# Patient Record
Sex: MALE | Race: BLACK OR AFRICAN AMERICAN | NOT HISPANIC OR LATINO | Employment: UNEMPLOYED | URBAN - METROPOLITAN AREA
[De-identification: names, ages, dates, MRNs, and addresses within clinical notes are randomized per-mention and may not be internally consistent; named-entity substitution may affect disease eponyms.]

---

## 2024-01-22 ENCOUNTER — CLINICAL SUPPORT (OUTPATIENT)
Dept: EMERGENCY MEDICINE | Facility: HOSPITAL | Age: 29
End: 2024-01-22
Payer: COMMERCIAL

## 2024-01-22 ENCOUNTER — HOSPITAL ENCOUNTER (EMERGENCY)
Facility: HOSPITAL | Age: 29
Discharge: HOME | End: 2024-01-22
Attending: EMERGENCY MEDICINE
Payer: COMMERCIAL

## 2024-01-22 VITALS
OXYGEN SATURATION: 96 % | TEMPERATURE: 98.4 F | RESPIRATION RATE: 18 BRPM | SYSTOLIC BLOOD PRESSURE: 147 MMHG | HEART RATE: 61 BPM | DIASTOLIC BLOOD PRESSURE: 94 MMHG

## 2024-01-22 DIAGNOSIS — R55 SYNCOPE, NEAR: Primary | ICD-10-CM

## 2024-01-22 LAB
ALBUMIN SERPL BCP-MCNC: 4.9 G/DL (ref 3.4–5)
ALP SERPL-CCNC: 41 U/L (ref 33–120)
ALT SERPL W P-5'-P-CCNC: 16 U/L (ref 10–52)
ANION GAP BLDV CALCULATED.4IONS-SCNC: 4 MMOL/L (ref 10–25)
ANION GAP SERPL CALC-SCNC: 15 MMOL/L (ref 10–20)
AST SERPL W P-5'-P-CCNC: 18 U/L (ref 9–39)
ATRIAL RATE: 62 BPM
BASE EXCESS BLDV CALC-SCNC: 8.2 MMOL/L (ref -2–3)
BASOPHILS # BLD AUTO: 0.02 X10*3/UL (ref 0–0.1)
BASOPHILS NFR BLD AUTO: 0.5 %
BILIRUB SERPL-MCNC: 0.3 MG/DL (ref 0–1.2)
BODY TEMPERATURE: 37 DEGREES CELSIUS
BUN SERPL-MCNC: 20 MG/DL (ref 6–23)
CA-I BLDV-SCNC: 1.23 MMOL/L (ref 1.1–1.33)
CALCIUM SERPL-MCNC: 10.1 MG/DL (ref 8.6–10.6)
CARDIAC TROPONIN I PNL SERPL HS: 5 NG/L (ref 0–53)
CHLORIDE BLDV-SCNC: 103 MMOL/L (ref 98–107)
CHLORIDE SERPL-SCNC: 102 MMOL/L (ref 98–107)
CO2 SERPL-SCNC: 29 MMOL/L (ref 21–32)
CREAT SERPL-MCNC: 0.83 MG/DL (ref 0.5–1.3)
EGFRCR SERPLBLD CKD-EPI 2021: >90 ML/MIN/1.73M*2
EOSINOPHIL # BLD AUTO: 0.05 X10*3/UL (ref 0–0.7)
EOSINOPHIL NFR BLD AUTO: 1.2 %
ERYTHROCYTE [DISTWIDTH] IN BLOOD BY AUTOMATED COUNT: 12.4 % (ref 11.5–14.5)
GLUCOSE BLDV-MCNC: 90 MG/DL (ref 74–99)
GLUCOSE SERPL-MCNC: 84 MG/DL (ref 74–99)
HCO3 BLDV-SCNC: 35.7 MMOL/L (ref 22–26)
HCT VFR BLD AUTO: 44.8 % (ref 41–52)
HCT VFR BLD EST: 48 % (ref 41–52)
HGB BLD-MCNC: 15.9 G/DL (ref 13.5–17.5)
HGB BLDV-MCNC: 16 G/DL (ref 13.5–17.5)
IMM GRANULOCYTES # BLD AUTO: 0 X10*3/UL (ref 0–0.7)
IMM GRANULOCYTES NFR BLD AUTO: 0 % (ref 0–0.9)
INHALED O2 CONCENTRATION: 21 %
LACTATE BLDV-SCNC: 1 MMOL/L (ref 0.4–2)
LEVETIRACETAM SERPL-MCNC: <2 UG/ML (ref 10–40)
LYMPHOCYTES # BLD AUTO: 1.42 X10*3/UL (ref 1.2–4.8)
LYMPHOCYTES NFR BLD AUTO: 34.8 %
MCH RBC QN AUTO: 30.5 PG (ref 26–34)
MCHC RBC AUTO-ENTMCNC: 35.5 G/DL (ref 32–36)
MCV RBC AUTO: 86 FL (ref 80–100)
MONOCYTES # BLD AUTO: 0.3 X10*3/UL (ref 0.1–1)
MONOCYTES NFR BLD AUTO: 7.4 %
NEUTROPHILS # BLD AUTO: 2.29 X10*3/UL (ref 1.2–7.7)
NEUTROPHILS NFR BLD AUTO: 56.1 %
NRBC BLD-RTO: 0 /100 WBCS (ref 0–0)
OXYHGB MFR BLDV: 41.4 % (ref 45–75)
P AXIS: 41 DEGREES
P OFFSET: 171 MS
P ONSET: 120 MS
PCO2 BLDV: 59 MM HG (ref 41–51)
PH BLDV: 7.39 PH (ref 7.33–7.43)
PLATELET # BLD AUTO: 194 X10*3/UL (ref 150–450)
PO2 BLDV: 37 MM HG (ref 35–45)
POTASSIUM BLDV-SCNC: 4.8 MMOL/L (ref 3.5–5.3)
POTASSIUM SERPL-SCNC: 4.6 MMOL/L (ref 3.5–5.3)
PR INTERVAL: 202 MS
PROT SERPL-MCNC: 7.6 G/DL (ref 6.4–8.2)
Q ONSET: 221 MS
QRS COUNT: 10 BEATS
QRS DURATION: 88 MS
QT INTERVAL: 362 MS
QTC CALCULATION(BAZETT): 367 MS
QTC FREDERICIA: 366 MS
R AXIS: 77 DEGREES
RBC # BLD AUTO: 5.21 X10*6/UL (ref 4.5–5.9)
SAO2 % BLDV: 42 % (ref 45–75)
SODIUM BLDV-SCNC: 138 MMOL/L (ref 136–145)
SODIUM SERPL-SCNC: 141 MMOL/L (ref 136–145)
T AXIS: 22 DEGREES
T OFFSET: 402 MS
VENTRICULAR RATE: 62 BPM
WBC # BLD AUTO: 4.1 X10*3/UL (ref 4.4–11.3)

## 2024-01-22 PROCEDURE — 36415 COLL VENOUS BLD VENIPUNCTURE: CPT | Performed by: EMERGENCY MEDICINE

## 2024-01-22 PROCEDURE — 84132 ASSAY OF SERUM POTASSIUM: CPT | Performed by: EMERGENCY MEDICINE

## 2024-01-22 PROCEDURE — 99283 EMERGENCY DEPT VISIT LOW MDM: CPT

## 2024-01-22 PROCEDURE — 85025 COMPLETE CBC W/AUTO DIFF WBC: CPT | Performed by: EMERGENCY MEDICINE

## 2024-01-22 PROCEDURE — 85018 HEMOGLOBIN: CPT | Mod: 59 | Performed by: EMERGENCY MEDICINE

## 2024-01-22 PROCEDURE — 84484 ASSAY OF TROPONIN QUANT: CPT | Performed by: EMERGENCY MEDICINE

## 2024-01-22 PROCEDURE — 99285 EMERGENCY DEPT VISIT HI MDM: CPT | Performed by: EMERGENCY MEDICINE

## 2024-01-22 PROCEDURE — 80177 DRUG SCRN QUAN LEVETIRACETAM: CPT | Performed by: EMERGENCY MEDICINE

## 2024-01-22 PROCEDURE — 99284 EMERGENCY DEPT VISIT MOD MDM: CPT | Performed by: EMERGENCY MEDICINE

## 2024-01-22 PROCEDURE — 93005 ELECTROCARDIOGRAM TRACING: CPT

## 2024-01-22 PROCEDURE — 80183 DRUG SCRN QUANT OXCARBAZEPIN: CPT | Performed by: EMERGENCY MEDICINE

## 2024-01-22 ASSESSMENT — COLUMBIA-SUICIDE SEVERITY RATING SCALE - C-SSRS
6. HAVE YOU EVER DONE ANYTHING, STARTED TO DO ANYTHING, OR PREPARED TO DO ANYTHING TO END YOUR LIFE?: NO
2. HAVE YOU ACTUALLY HAD ANY THOUGHTS OF KILLING YOURSELF?: NO
1. IN THE PAST MONTH, HAVE YOU WISHED YOU WERE DEAD OR WISHED YOU COULD GO TO SLEEP AND NOT WAKE UP?: NO

## 2024-01-22 NOTE — DISCHARGE INSTRUCTIONS
Continue seizure medication as prescribed. Observe fall precaution at home.     Follow up with your Neurology appointment as scheduled below.  01/26/2024 10:00 AM EST Office Visit [facility] Neurology  [address] [place] [address]  [phone]   Dee Webb MD  [address] [place] [id number] [phone] (Work)  [phone] (Fax)     Come back to the ED if you experience persistent headache, dizziness, lightheadedness, loss of balance, shortness of breath, or chest pain.

## 2024-01-22 NOTE — ED PROVIDER NOTES
"HPI   Chief Complaint   Patient presents with   • Seizures       HPI  Dee Dee is a 28-year-old male who was brought to the ED by the EMS with complaint of seizure.  Medical history significant for seizure, recurrent MDD, anxiety, and ADHD.  Patient stated that seizure alcohol about 45 minutes ago.  He is unaware how long it lasted, and referred made to Mr. Simmons, the owner of the group home where he came from.  Mr. Simmons stated that he did not witness the event.  He states that the patient was in the library when he got caught, the library and caught him to inform him that the patient had passed out and fell without describing a seizure episode.  He added that the patient has been compliant with his medications with less efficacy.  He stated that the patient usually have \"spells of being spaced out\" whenever he is stressed out from receiving bad news, or when he misses medication.  The patient endorses that he has been stressed out thinking too much about his family, recently.  The patient denies headache, dizziness, lightheadedness, fever, shortness of breath, nausea, vomiting, chest pain, abdominal pain, or urinary symptoms.                  No data recorded                Patient History   No past medical history on file.  Past Surgical History:   Procedure Laterality Date   • MR HEAD ANGIO WO IV CONTRAST  4/10/2019    MR HEAD ANGIO WO IV CONTRAST 4/10/2019 [facility] CLINICAL LEGACY   • MR NECK ANGIO WO IV CONTRAST  4/10/2019    MR NECK ANGIO WO IV CONTRAST 4/10/2019 [facility] CLINICAL LEGACY     No family history on file.  Social History     Tobacco Use   • Smoking status: Not on file   • Smokeless tobacco: Not on file   Substance Use Topics   • Alcohol use: Not on file   • Drug use: Not on file       Physical Exam   ED Triage Vitals [01/22/24 1312]   Temp Heart Rate Respirations BP   36.9 °C (98.4 °F) 74 16 118/82      Pulse Ox Temp Source Heart Rate Source Patient Position   94 % Temporal -- --      BP Location FiO2 (%)   "   -- --       Physical Exam  Vitals reviewed.   Constitutional:       General: He is not in acute distress.     Appearance: Normal appearance. He is normal weight. He is not ill-appearing or toxic-appearing.   HENT:      Head: Normocephalic and atraumatic.      Right Ear: External ear normal.      Left Ear: External ear normal.      Nose: No congestion or rhinorrhea.      Mouth/Throat:      Pharynx: No oropharyngeal exudate or posterior oropharyngeal erythema.   Eyes:      General: No scleral icterus.        Right eye: No discharge.         Left eye: No discharge.      Extraocular Movements: Extraocular movements intact.      Conjunctiva/sclera: Conjunctivae normal.      Pupils: Pupils are equal, round, and reactive to light.   Cardiovascular:      Rate and Rhythm: Normal rate and regular rhythm.      Pulses: Normal pulses.      Heart sounds: Normal heart sounds. No murmur heard.     No friction rub. No gallop.   Pulmonary:      Effort: Pulmonary effort is normal. No respiratory distress.      Breath sounds: Normal breath sounds. No stridor. No wheezing, rhonchi or rales.   Abdominal:      General: Abdomen is flat. Bowel sounds are normal. There is no distension.      Palpations: Abdomen is soft. There is no mass.      Tenderness: There is no abdominal tenderness. There is no guarding or rebound.   Musculoskeletal:         General: No swelling, tenderness, deformity or signs of injury. Normal range of motion.      Cervical back: Normal range of motion and neck supple. No rigidity or tenderness.      Right lower leg: No edema.      Left lower leg: No edema.   Skin:     General: Skin is warm.      Capillary Refill: Capillary refill takes less than 2 seconds.      Coloration: Skin is not jaundiced or pale.      Findings: No lesion or rash.   Neurological:      General: No focal deficit present.      Mental Status: He is alert and oriented to person, place, and time. Mental status is at baseline.      Sensory: No  sensory deficit.      Motor: No weakness.   Psychiatric:         Mood and Affect: Mood normal.         Behavior: Behavior normal.         Thought Content: Thought content normal.         ED Course & MDM   ED Course as of 01/22/24 1803 Mon Jan 22, 2024 1709 EKG shows NR HR 62 normal axis CORIE in v2 and v3 c/w prior, TWI in III c/w prior [AM]      ED Course User Index  [AM] Guillaume Horta MD         Diagnoses as of 01/22/24 1803   Syncope, near       Medical Decision Making  Patient  is a 28-year-old male who was brought to the ED by the EMS with complaint of seizure.  The patient is awake, alert, and oriented.  He is well-appearing, nontoxic, not in apparent distress.  Given the current presentation, syncopal episode is stable for differential.  Will order cardiac workup, and check for antiseizure medication level.  The result for CBC is unremarkable.  Chemistry panel shows no evidence of electrolyte imbalance.  Lab results shows troponin within normal level.  Venous blood gas shows mild respiratory acidosis, lactate within normal ranges, and reduced anion gap.  Serum Keppra level is decreased below therapeutic ranges.    Patient was seen and evaluated by the attending, Dr. Horta.  Recommendation is to discharge patient to follow-up with his primary care physician and neurologist for outpatient management.  The lab result was communicated to the patient, he was informed about the management plan.  The patient expressed understanding and agreed to the management plan.  He maintained normal vital signs during the ED course.  The patient was discharged in a stable condition with instructions to follow-up with his primary care physician and neurologist regarding his ED visit.        Procedure  Procedures     Rina Tong MD  Resident  01/22/24 1803

## 2024-01-22 NOTE — ED TRIAGE NOTES
Pt had witnessed seizure at library, hit head, per EMS postictal on their arrival. Pt says he's been compliant with Keppra

## 2024-01-23 ENCOUNTER — HOSPITAL ENCOUNTER (EMERGENCY)
Facility: HOSPITAL | Age: 29
Discharge: HOME | End: 2024-01-23
Attending: EMERGENCY MEDICINE
Payer: COMMERCIAL

## 2024-01-23 ENCOUNTER — APPOINTMENT (OUTPATIENT)
Dept: RADIOLOGY | Facility: HOSPITAL | Age: 29
End: 2024-01-23
Payer: COMMERCIAL

## 2024-01-23 VITALS
RESPIRATION RATE: 18 BRPM | OXYGEN SATURATION: 95 % | HEART RATE: 79 BPM | DIASTOLIC BLOOD PRESSURE: 73 MMHG | SYSTOLIC BLOOD PRESSURE: 124 MMHG | BODY MASS INDEX: 21.66 KG/M2 | WEIGHT: 130 LBS | HEIGHT: 65 IN | TEMPERATURE: 97 F

## 2024-01-23 DIAGNOSIS — G40.919 BREAKTHROUGH SEIZURE (MULTI): Primary | ICD-10-CM

## 2024-01-23 LAB
ALBUMIN SERPL BCP-MCNC: 4.5 G/DL (ref 3.4–5)
ALP SERPL-CCNC: 45 U/L (ref 33–120)
ALT SERPL W P-5'-P-CCNC: 15 U/L (ref 10–52)
AMPHETAMINES UR QL SCN: NORMAL
ANION GAP SERPL CALC-SCNC: 12 MMOL/L (ref 10–20)
APAP SERPL-MCNC: <10 UG/ML
AST SERPL W P-5'-P-CCNC: 19 U/L (ref 9–39)
BARBITURATES UR QL SCN: NORMAL
BASOPHILS # BLD AUTO: 0.04 X10*3/UL (ref 0–0.1)
BASOPHILS NFR BLD AUTO: 0.8 %
BENZODIAZ UR QL SCN: NORMAL
BILIRUB SERPL-MCNC: 0.3 MG/DL (ref 0–1.2)
BUN SERPL-MCNC: 25 MG/DL (ref 6–23)
BZE UR QL SCN: NORMAL
CALCIUM SERPL-MCNC: 9.7 MG/DL (ref 8.6–10.6)
CANNABINOIDS UR QL SCN: NORMAL
CHLORIDE SERPL-SCNC: 103 MMOL/L (ref 98–107)
CO2 SERPL-SCNC: 28 MMOL/L (ref 21–32)
CREAT SERPL-MCNC: 0.83 MG/DL (ref 0.5–1.3)
EGFRCR SERPLBLD CKD-EPI 2021: >90 ML/MIN/1.73M*2
EOSINOPHIL # BLD AUTO: 0.1 X10*3/UL (ref 0–0.7)
EOSINOPHIL NFR BLD AUTO: 2.1 %
ERYTHROCYTE [DISTWIDTH] IN BLOOD BY AUTOMATED COUNT: 12.3 % (ref 11.5–14.5)
ETHANOL SERPL-MCNC: <10 MG/DL
FENTANYL+NORFENTANYL UR QL SCN: NORMAL
GLUCOSE BLD MANUAL STRIP-MCNC: 90 MG/DL (ref 74–99)
GLUCOSE SERPL-MCNC: 85 MG/DL (ref 74–99)
HCT VFR BLD AUTO: 43 % (ref 41–52)
HGB BLD-MCNC: 15.5 G/DL (ref 13.5–17.5)
IMM GRANULOCYTES # BLD AUTO: 0.01 X10*3/UL (ref 0–0.7)
IMM GRANULOCYTES NFR BLD AUTO: 0.2 % (ref 0–0.9)
LEVETIRACETAM SERPL-MCNC: <2 UG/ML (ref 10–40)
LYMPHOCYTES # BLD AUTO: 1.96 X10*3/UL (ref 1.2–4.8)
LYMPHOCYTES NFR BLD AUTO: 41.4 %
MAGNESIUM SERPL-MCNC: 1.92 MG/DL (ref 1.6–2.4)
MCH RBC QN AUTO: 31 PG (ref 26–34)
MCHC RBC AUTO-ENTMCNC: 36 G/DL (ref 32–36)
MCV RBC AUTO: 86 FL (ref 80–100)
MONOCYTES # BLD AUTO: 0.35 X10*3/UL (ref 0.1–1)
MONOCYTES NFR BLD AUTO: 7.4 %
NEUTROPHILS # BLD AUTO: 2.28 X10*3/UL (ref 1.2–7.7)
NEUTROPHILS NFR BLD AUTO: 48.1 %
NRBC BLD-RTO: 0 /100 WBCS (ref 0–0)
OPIATES UR QL SCN: NORMAL
OXYCODONE+OXYMORPHONE UR QL SCN: NORMAL
PCP UR QL SCN: NORMAL
PHOSPHATE SERPL-MCNC: 4.7 MG/DL (ref 2.5–4.9)
PLATELET # BLD AUTO: 176 X10*3/UL (ref 150–450)
POTASSIUM SERPL-SCNC: 4 MMOL/L (ref 3.5–5.3)
PROT SERPL-MCNC: 6.9 G/DL (ref 6.4–8.2)
RBC # BLD AUTO: 5 X10*6/UL (ref 4.5–5.9)
SALICYLATES SERPL-MCNC: <3 MG/DL
SODIUM SERPL-SCNC: 139 MMOL/L (ref 136–145)
WBC # BLD AUTO: 4.7 X10*3/UL (ref 4.4–11.3)

## 2024-01-23 PROCEDURE — 99284 EMERGENCY DEPT VISIT MOD MDM: CPT | Performed by: NURSE PRACTITIONER

## 2024-01-23 PROCEDURE — 84100 ASSAY OF PHOSPHORUS: CPT | Performed by: NURSE PRACTITIONER

## 2024-01-23 PROCEDURE — 80177 DRUG SCRN QUAN LEVETIRACETAM: CPT | Performed by: NURSE PRACTITIONER

## 2024-01-23 PROCEDURE — 80143 DRUG ASSAY ACETAMINOPHEN: CPT | Performed by: NURSE PRACTITIONER

## 2024-01-23 PROCEDURE — 96374 THER/PROPH/DIAG INJ IV PUSH: CPT

## 2024-01-23 PROCEDURE — 99284 EMERGENCY DEPT VISIT MOD MDM: CPT | Mod: 25

## 2024-01-23 PROCEDURE — 82947 ASSAY GLUCOSE BLOOD QUANT: CPT | Mod: 59

## 2024-01-23 PROCEDURE — 80053 COMPREHEN METABOLIC PANEL: CPT | Performed by: NURSE PRACTITIONER

## 2024-01-23 PROCEDURE — 80307 DRUG TEST PRSMV CHEM ANLYZR: CPT | Performed by: NURSE PRACTITIONER

## 2024-01-23 PROCEDURE — 85025 COMPLETE CBC W/AUTO DIFF WBC: CPT | Performed by: NURSE PRACTITIONER

## 2024-01-23 PROCEDURE — 83735 ASSAY OF MAGNESIUM: CPT | Performed by: NURSE PRACTITIONER

## 2024-01-23 PROCEDURE — 2500000004 HC RX 250 GENERAL PHARMACY W/ HCPCS (ALT 636 FOR OP/ED): Mod: SE | Performed by: NURSE PRACTITIONER

## 2024-01-23 PROCEDURE — 70450 CT HEAD/BRAIN W/O DYE: CPT | Performed by: STUDENT IN AN ORGANIZED HEALTH CARE EDUCATION/TRAINING PROGRAM

## 2024-01-23 PROCEDURE — 36415 COLL VENOUS BLD VENIPUNCTURE: CPT | Performed by: NURSE PRACTITIONER

## 2024-01-23 PROCEDURE — 70450 CT HEAD/BRAIN W/O DYE: CPT

## 2024-01-23 RX ORDER — LEVETIRACETAM 500 MG/1
500 TABLET ORAL 2 TIMES DAILY
Qty: 60 TABLET | Refills: 11 | Status: SHIPPED | OUTPATIENT
Start: 2024-01-23 | End: 2024-05-02

## 2024-01-23 RX ORDER — LEVETIRACETAM 10 MG/ML
1000 INJECTION INTRAVASCULAR ONCE
Status: COMPLETED | OUTPATIENT
Start: 2024-01-23 | End: 2024-01-23

## 2024-01-23 RX ADMIN — LEVETIRACETAM 1000 MG: 10 INJECTION INTRAVENOUS at 22:10

## 2024-01-23 ASSESSMENT — COLUMBIA-SUICIDE SEVERITY RATING SCALE - C-SSRS
2. HAVE YOU ACTUALLY HAD ANY THOUGHTS OF KILLING YOURSELF?: NO
6. HAVE YOU EVER DONE ANYTHING, STARTED TO DO ANYTHING, OR PREPARED TO DO ANYTHING TO END YOUR LIFE?: NO
1. IN THE PAST MONTH, HAVE YOU WISHED YOU WERE DEAD OR WISHED YOU COULD GO TO SLEEP AND NOT WAKE UP?: NO

## 2024-01-23 ASSESSMENT — PAIN SCALES - GENERAL: PAINLEVEL_OUTOF10: 0 - NO PAIN

## 2024-01-23 ASSESSMENT — LIFESTYLE VARIABLES
HAVE YOU EVER FELT YOU SHOULD CUT DOWN ON YOUR DRINKING: NO
EVER FELT BAD OR GUILTY ABOUT YOUR DRINKING: NO
HAVE PEOPLE ANNOYED YOU BY CRITICIZING YOUR DRINKING: NO
REASON UNABLE TO ASSESS: NO
EVER HAD A DRINK FIRST THING IN THE MORNING TO STEADY YOUR NERVES TO GET RID OF A HANGOVER: NO

## 2024-01-23 ASSESSMENT — PAIN - FUNCTIONAL ASSESSMENT: PAIN_FUNCTIONAL_ASSESSMENT: 0-10

## 2024-01-23 NOTE — ED TRIAGE NOTES
Pt reports that he was at the library and had a witnessed seizure while sitting in a chair. He states that he did not fall or hit his head he reports compliance with his seizure medication

## 2024-01-24 NOTE — ED PROVIDER NOTES
HPI   Chief Complaint   Patient presents with    Seizures         History provided by:  Patient   used: No         28-year-old male presents via EMS for evaluation of seizure prior to arrival while at the library.  Patient states that he does not remember what happened but he woke up on the ground.  He states that he takes his Keppra compliantly which is twice a day.  He denies any symptoms at this time and states that he otherwise feels well.  He denies any additional trauma, falls, injury, anticoagulation use, smoking, drugs, alcohol today, recent travel, child or known sick contacts.  Denies any additional symptoms or complaints this time.    ROS is otherwise negative unless stated above.               No data recorded                Patient History   History reviewed. No pertinent past medical history.  Past Surgical History:   Procedure Laterality Date    MR HEAD ANGIO WO IV CONTRAST  4/10/2019    MR HEAD ANGIO WO IV CONTRAST 4/10/2019 [facility] CLINICAL LEGACY    MR NECK ANGIO WO IV CONTRAST  4/10/2019    MR NECK ANGIO WO IV CONTRAST 4/10/2019 [facility] CLINICAL LEGACY     No family history on file.  Social History     Tobacco Use    Smoking status: Unknown    Smokeless tobacco: Not on file   Substance Use Topics    Alcohol use: Not on file    Drug use: Not on file       Physical Exam   ED Triage Vitals [01/23/24 1532]   Temperature Heart Rate Respirations BP   36.1 °C (97 °F) 79 18 124/73      Pulse Ox Temp Source Heart Rate Source Patient Position   95 % Temporal Monitor Sitting      BP Location FiO2 (%)     Right arm 21 %       Physical Exam    VS: As documented in the triage note and EMR flowsheet from this visit were reviewed.    GEN: NAD, nontoxic, well-appearing, resting comfortably in ED cart without difficulty or dyspnea  EYES:  EOMs grossly intact, anicteric sclera, no nystagmus noted, clear and equal bilaterally, no foreign body noted  HEENT: Airway patent, ears with clear tympanic  membranes bilaterally. Nasal mucosa clear. Mouth with normal mucosa.  No area of abscess or fluctuance noted.  No drainage noted. Throat has no vesicles, no oropharyngeal exudates and uvula is midline. Face with no lymph node enlargement. No trismus or drooling noted.  Handling secretions.  Speech clear.  CARD: RRR, nontender chest, no crepitus deformities, no JVD, no murmurs rubs or gallops ; No edema noted.  Positive pulses bilaterally throughout.  Capillary refill less than 3 seconds.  No abnormal redness, warmth, tenderness or swelling noted to bilateral lower extremities.  PULMONARY: Clear all lung fields. Moving air well, Nonlabored, no accessory muscle use, able to speak complete sentences  ABDOMEN: Abdomen soft, non-distended, no rebound, no guarding. Bowel sounds normal in all 4 quadrants. No tenderness to palpation.  No CVA tenderness.  No masses or organomegaly noted.    : deferred  MUSK: Spine appears normal, range of motion is not limited, no muscle or joint tenderness. Strength 5 out of 5 equal bilaterally throughout.  No step-offs, deformities or additional signs of trauma noted.  No spinal/midline tenderness to palpation  SKIN: Skin normal color for race, warm, dry and intact. No evidence of trauma. No rash noted.  NEURO: Alert and oriented x 3, speech is clear, no obvious deficits noted. No facial droop noted.  Speech clear.  Negative Kernig's and Brudzinski sign.  PSYCH: Alert and oriented to person, place, time/situation. normal mood and affect. No apparent risk to self or others. Thoughts are linear.  Does not appear decompensated.  Does not appear internally stimulated.  LYMPH: No adenopathy or splenomegaly. No cervical, supraclavicular or inguinal lymphadenopathy.    ED Course & MDM   Diagnoses as of 01/24/24 0052   Breakthrough seizure (CMS/[facility])       Medical Decision Making    Upon assessment patient is a healthy nontoxic-appearing male in no apparent distress.  He is ambulating  independently without difficulty or dyspnea.  He is placed on continuous cardiac monitor and pulse oximetry.  He is neurologically intact without any focal neurodeficits noted.  He is tolerating p.o. without difficulty and there is no seizure-like activity noted at this time.  Plan is to obtain imaging and laboratory studies.  I did review his ED visit from yesterday for a breakthrough seizure and as they did not perform a CT head at this time and as he has now had multiple breakthrough seizures I elected to obtain 1 at this time to make sure that we are not missing any underlying process as a cause of these breakthroughs versus medication noncompliance.  He declined need for medications at this time.    Workup was reviewed.  Noted his subtherapeutic Keppra dose which was also subtherapeutic yesterday.  I did give him a Keppra loading dose.  Per chart review yesterday's chart stated that patient is not on Keppra but patient is adamant that he is and that he needs a refill.  He states that he takes his Depakote in addition and is compliant with this.  He has a result still running from yesterday.  Upon reevaluation patient is reassured.  He remained without any seizure-like activity while in the ED and he remained hemodynamically stable.  Findings and plan were discussed with patient and he is agreeable for plan to discharge home with appropriate follow-up scheduled this upcoming week.  I did contemplate consulting neurology at this time but as this is likely medication noncompliance there is not much we can do at this time besides make sure that the patient takes his medications.      EKG Interpretation: N/A    Differential Diagnoses Considered: Breakthrough seizure, medication noncompliance, electrolyte abnormality, drug use, acute intracranial process    Chronic Medical Conditions Significantly Affecting Care: Medication noncompliance    External Records Reviewed: I reviewed recent and relevant outside records  including: PCP notes, prior discharge summary, previous radiologic studies, HIE    Independent Interpretation of Studies:  I independently interpreted: None    Escalation of Care:  Appropriate for outpatient management with primary care provider and neurology appointments as scheduled for further management and evaluation.  Return precautions discussed and patient verbalized understanding. All questions and concerns were addressed.    Social Determinants of Health Significantly Affecting Care:  Lacking transportation    Prescription Drug Consideration: Keppra 500 mg twice daily    Diagnostic testing considered: No additional    Discussion of Management with Other Providers:   I discussed the patient/results with: none      This patient was staffed with ED Attending Dr. Schultz to review the plan of care during ED course.    *Please note that portions of this note may have been completed with a voice recognition program.  Efforts were made to edit the dictations but occasionally, words are mis-transcribed.      Procedure  Procedures     KARLO Saavedra-CNP  01/24/24 0058

## 2024-01-25 LAB — 10OH-CARBAZEPINE SERPL-MCNC: <1 UG/ML (ref 3–35)

## 2024-05-01 ENCOUNTER — APPOINTMENT (OUTPATIENT)
Dept: RADIOLOGY | Facility: HOSPITAL | Age: 29
End: 2024-05-01
Payer: COMMERCIAL

## 2024-05-01 ENCOUNTER — CLINICAL SUPPORT (OUTPATIENT)
Dept: EMERGENCY MEDICINE | Facility: HOSPITAL | Age: 29
End: 2024-05-01
Payer: COMMERCIAL

## 2024-05-01 ENCOUNTER — HOSPITAL ENCOUNTER (EMERGENCY)
Facility: HOSPITAL | Age: 29
Discharge: HOME | End: 2024-05-02
Attending: EMERGENCY MEDICINE
Payer: COMMERCIAL

## 2024-05-01 DIAGNOSIS — R56.9 SEIZURE (MULTI): Primary | ICD-10-CM

## 2024-05-01 DIAGNOSIS — G40.919 BREAKTHROUGH SEIZURE (MULTI): ICD-10-CM

## 2024-05-01 LAB
ALBUMIN SERPL BCP-MCNC: 5 G/DL (ref 3.4–5)
ALP SERPL-CCNC: 50 U/L (ref 33–120)
ALT SERPL W P-5'-P-CCNC: 28 U/L (ref 10–52)
AMPHETAMINES UR QL SCN: NORMAL
ANION GAP BLDV CALCULATED.4IONS-SCNC: 13 MMOL/L (ref 10–25)
ANION GAP BLDV CALCULATED.4IONS-SCNC: 30 MMOL/L (ref 10–25)
ANION GAP SERPL CALC-SCNC: 32 MMOL/L (ref 10–20)
APPEARANCE UR: CLEAR
AST SERPL W P-5'-P-CCNC: 28 U/L (ref 9–39)
BARBITURATES UR QL SCN: NORMAL
BASE EXCESS BLDV CALC-SCNC: -15.5 MMOL/L (ref -2–3)
BASE EXCESS BLDV CALC-SCNC: -3.2 MMOL/L (ref -2–3)
BASOPHILS # BLD AUTO: 0.06 X10*3/UL (ref 0–0.1)
BASOPHILS NFR BLD AUTO: 0.8 %
BENZODIAZ UR QL SCN: NORMAL
BILIRUB SERPL-MCNC: 0.4 MG/DL (ref 0–1.2)
BILIRUB UR STRIP.AUTO-MCNC: NEGATIVE MG/DL
BODY TEMPERATURE: 37 DEGREES CELSIUS
BODY TEMPERATURE: 37 DEGREES CELSIUS
BUN SERPL-MCNC: 21 MG/DL (ref 6–23)
BZE UR QL SCN: NORMAL
CA-I BLDV-SCNC: 0.99 MMOL/L (ref 1.1–1.33)
CA-I BLDV-SCNC: 1.26 MMOL/L (ref 1.1–1.33)
CALCIUM SERPL-MCNC: 9.9 MG/DL (ref 8.6–10.6)
CANNABINOIDS UR QL SCN: NORMAL
CARDIAC TROPONIN I PNL SERPL HS: 5 NG/L (ref 0–53)
CHLORIDE BLDV-SCNC: 100 MMOL/L (ref 98–107)
CHLORIDE BLDV-SCNC: 106 MMOL/L (ref 98–107)
CHLORIDE SERPL-SCNC: 103 MMOL/L (ref 98–107)
CO2 SERPL-SCNC: 14 MMOL/L (ref 21–32)
COLOR UR: YELLOW
CREAT SERPL-MCNC: 0.98 MG/DL (ref 0.5–1.3)
EGFRCR SERPLBLD CKD-EPI 2021: >90 ML/MIN/1.73M*2
EOSINOPHIL # BLD AUTO: 0.14 X10*3/UL (ref 0–0.7)
EOSINOPHIL NFR BLD AUTO: 1.9 %
ERYTHROCYTE [DISTWIDTH] IN BLOOD BY AUTOMATED COUNT: 12.7 % (ref 11.5–14.5)
FENTANYL+NORFENTANYL UR QL SCN: NORMAL
GLUCOSE BLD MANUAL STRIP-MCNC: 111 MG/DL (ref 74–99)
GLUCOSE BLDV-MCNC: 70 MG/DL (ref 74–99)
GLUCOSE BLDV-MCNC: 97 MG/DL (ref 74–99)
GLUCOSE SERPL-MCNC: 91 MG/DL (ref 74–99)
GLUCOSE UR STRIP.AUTO-MCNC: NORMAL MG/DL
HCO3 BLDV-SCNC: 14 MMOL/L (ref 22–26)
HCO3 BLDV-SCNC: 23.2 MMOL/L (ref 22–26)
HCT VFR BLD AUTO: 47.5 % (ref 41–52)
HCT VFR BLD EST: 37 % (ref 41–52)
HCT VFR BLD EST: 48 % (ref 41–52)
HGB BLD-MCNC: 16 G/DL (ref 13.5–17.5)
HGB BLDV-MCNC: 12.2 G/DL (ref 13.5–17.5)
HGB BLDV-MCNC: 16 G/DL (ref 13.5–17.5)
IMM GRANULOCYTES # BLD AUTO: 0.02 X10*3/UL (ref 0–0.7)
IMM GRANULOCYTES NFR BLD AUTO: 0.3 % (ref 0–0.9)
INHALED O2 CONCENTRATION: 21 %
KETONES UR STRIP.AUTO-MCNC: ABNORMAL MG/DL
LACTATE BLDV-SCNC: 0.9 MMOL/L (ref 0.4–2)
LACTATE BLDV-SCNC: >17 MMOL/L (ref 0.4–2)
LEUKOCYTE ESTERASE UR QL STRIP.AUTO: NEGATIVE
LEVETIRACETAM SERPL-MCNC: 10 UG/ML (ref 10–40)
LYMPHOCYTES # BLD AUTO: 3.66 X10*3/UL (ref 1.2–4.8)
LYMPHOCYTES NFR BLD AUTO: 50.4 %
MCH RBC QN AUTO: 29.6 PG (ref 26–34)
MCHC RBC AUTO-ENTMCNC: 33.7 G/DL (ref 32–36)
MCV RBC AUTO: 88 FL (ref 80–100)
METHADONE UR QL SCN: NORMAL
MONOCYTES # BLD AUTO: 0.87 X10*3/UL (ref 0.1–1)
MONOCYTES NFR BLD AUTO: 12 %
MUCOUS THREADS #/AREA URNS AUTO: NORMAL /LPF
NEUTROPHILS # BLD AUTO: 2.51 X10*3/UL (ref 1.2–7.7)
NEUTROPHILS NFR BLD AUTO: 34.6 %
NITRITE UR QL STRIP.AUTO: NEGATIVE
NRBC BLD-RTO: 0 /100 WBCS (ref 0–0)
OPIATES UR QL SCN: NORMAL
OXYCODONE+OXYMORPHONE UR QL SCN: NORMAL
OXYHGB MFR BLDV: 61.6 % (ref 45–75)
OXYHGB MFR BLDV: 97.6 % (ref 45–75)
PCO2 BLDV: 45 MM HG (ref 41–51)
PCO2 BLDV: 46 MM HG (ref 41–51)
PCP UR QL SCN: NORMAL
PH BLDV: 7.1 PH (ref 7.33–7.43)
PH BLDV: 7.31 PH (ref 7.33–7.43)
PH UR STRIP.AUTO: 5.5 [PH]
PLATELET # BLD AUTO: 234 X10*3/UL (ref 150–450)
PO2 BLDV: 159 MM HG (ref 35–45)
PO2 BLDV: 41 MM HG (ref 35–45)
POTASSIUM BLDV-SCNC: 3.7 MMOL/L (ref 3.5–5.3)
POTASSIUM BLDV-SCNC: 4.2 MMOL/L (ref 3.5–5.3)
POTASSIUM SERPL-SCNC: 3.6 MMOL/L (ref 3.5–5.3)
PROT SERPL-MCNC: 8.2 G/DL (ref 6.4–8.2)
PROT UR STRIP.AUTO-MCNC: ABNORMAL MG/DL
RBC # BLD AUTO: 5.4 X10*6/UL (ref 4.5–5.9)
RBC # UR STRIP.AUTO: NEGATIVE /UL
RBC #/AREA URNS AUTO: NORMAL /HPF
SAO2 % BLDV: 100 % (ref 45–75)
SAO2 % BLDV: 63 % (ref 45–75)
SODIUM BLDV-SCNC: 138 MMOL/L (ref 136–145)
SODIUM BLDV-SCNC: 140 MMOL/L (ref 136–145)
SODIUM SERPL-SCNC: 145 MMOL/L (ref 136–145)
SP GR UR STRIP.AUTO: 1.03
TSH SERPL-ACNC: 2.28 MIU/L (ref 0.44–3.98)
UROBILINOGEN UR STRIP.AUTO-MCNC: NORMAL MG/DL
WBC # BLD AUTO: 7.3 X10*3/UL (ref 4.4–11.3)
WBC #/AREA URNS AUTO: NORMAL /HPF

## 2024-05-01 PROCEDURE — 93005 ELECTROCARDIOGRAM TRACING: CPT

## 2024-05-01 PROCEDURE — 70450 CT HEAD/BRAIN W/O DYE: CPT | Performed by: RADIOLOGY

## 2024-05-01 PROCEDURE — 84484 ASSAY OF TROPONIN QUANT: CPT | Performed by: PHYSICIAN ASSISTANT

## 2024-05-01 PROCEDURE — 85025 COMPLETE CBC W/AUTO DIFF WBC: CPT | Performed by: PHYSICIAN ASSISTANT

## 2024-05-01 PROCEDURE — 84443 ASSAY THYROID STIM HORMONE: CPT | Performed by: STUDENT IN AN ORGANIZED HEALTH CARE EDUCATION/TRAINING PROGRAM

## 2024-05-01 PROCEDURE — 70450 CT HEAD/BRAIN W/O DYE: CPT

## 2024-05-01 PROCEDURE — 84132 ASSAY OF SERUM POTASSIUM: CPT | Performed by: PHYSICIAN ASSISTANT

## 2024-05-01 PROCEDURE — 36415 COLL VENOUS BLD VENIPUNCTURE: CPT

## 2024-05-01 PROCEDURE — 71045 X-RAY EXAM CHEST 1 VIEW: CPT | Performed by: RADIOLOGY

## 2024-05-01 PROCEDURE — 36415 COLL VENOUS BLD VENIPUNCTURE: CPT | Performed by: STUDENT IN AN ORGANIZED HEALTH CARE EDUCATION/TRAINING PROGRAM

## 2024-05-01 PROCEDURE — 82947 ASSAY GLUCOSE BLOOD QUANT: CPT

## 2024-05-01 PROCEDURE — 96374 THER/PROPH/DIAG INJ IV PUSH: CPT | Performed by: EMERGENCY MEDICINE

## 2024-05-01 PROCEDURE — 71045 X-RAY EXAM CHEST 1 VIEW: CPT

## 2024-05-01 PROCEDURE — 80164 ASSAY DIPROPYLACETIC ACD TOT: CPT

## 2024-05-01 PROCEDURE — 80177 DRUG SCRN QUAN LEVETIRACETAM: CPT | Performed by: PHYSICIAN ASSISTANT

## 2024-05-01 PROCEDURE — 96375 TX/PRO/DX INJ NEW DRUG ADDON: CPT | Performed by: EMERGENCY MEDICINE

## 2024-05-01 PROCEDURE — 2500000004 HC RX 250 GENERAL PHARMACY W/ HCPCS (ALT 636 FOR OP/ED): Mod: SE

## 2024-05-01 PROCEDURE — 82947 ASSAY GLUCOSE BLOOD QUANT: CPT | Mod: 59

## 2024-05-01 PROCEDURE — 93010 ELECTROCARDIOGRAM REPORT: CPT | Performed by: EMERGENCY MEDICINE

## 2024-05-01 PROCEDURE — 80307 DRUG TEST PRSMV CHEM ANLYZR: CPT | Performed by: PHYSICIAN ASSISTANT

## 2024-05-01 PROCEDURE — 99291 CRITICAL CARE FIRST HOUR: CPT | Performed by: EMERGENCY MEDICINE

## 2024-05-01 PROCEDURE — 84132 ASSAY OF SERUM POTASSIUM: CPT | Performed by: STUDENT IN AN ORGANIZED HEALTH CARE EDUCATION/TRAINING PROGRAM

## 2024-05-01 PROCEDURE — 81001 URINALYSIS AUTO W/SCOPE: CPT | Mod: 59 | Performed by: PHYSICIAN ASSISTANT

## 2024-05-01 PROCEDURE — 99204 OFFICE O/P NEW MOD 45 MIN: CPT

## 2024-05-01 PROCEDURE — 2500000004 HC RX 250 GENERAL PHARMACY W/ HCPCS (ALT 636 FOR OP/ED): Mod: SE | Performed by: STUDENT IN AN ORGANIZED HEALTH CARE EDUCATION/TRAINING PROGRAM

## 2024-05-01 PROCEDURE — 84132 ASSAY OF SERUM POTASSIUM: CPT

## 2024-05-01 PROCEDURE — 2500000004 HC RX 250 GENERAL PHARMACY W/ HCPCS (ALT 636 FOR OP/ED): Mod: SE | Performed by: EMERGENCY MEDICINE

## 2024-05-01 PROCEDURE — 99291 CRITICAL CARE FIRST HOUR: CPT | Mod: 25 | Performed by: EMERGENCY MEDICINE

## 2024-05-01 RX ORDER — LORAZEPAM 2 MG/ML
2 INJECTION INTRAMUSCULAR ONCE
Status: COMPLETED | OUTPATIENT
Start: 2024-05-01 | End: 2024-05-01

## 2024-05-01 RX ORDER — LORAZEPAM 2 MG/ML
4 INJECTION INTRAMUSCULAR ONCE
Status: COMPLETED | OUTPATIENT
Start: 2024-05-01 | End: 2024-05-01

## 2024-05-01 RX ORDER — LEVETIRACETAM 15 MG/ML
INJECTION INTRAVASCULAR
Status: COMPLETED
Start: 2024-05-01 | End: 2024-05-01

## 2024-05-01 RX ORDER — LEVETIRACETAM 15 MG/ML
20 INJECTION INTRAVASCULAR EVERY 12 HOURS
Status: DISCONTINUED | OUTPATIENT
Start: 2024-05-01 | End: 2024-05-02

## 2024-05-01 RX ORDER — LORAZEPAM 2 MG/ML
INJECTION INTRAMUSCULAR
Status: COMPLETED
Start: 2024-05-01 | End: 2024-05-01

## 2024-05-01 RX ORDER — LORAZEPAM 2 MG/ML
INJECTION INTRAMUSCULAR
Status: DISPENSED
Start: 2024-05-01 | End: 2024-05-02

## 2024-05-01 RX ADMIN — LEVETIRACETAM 1500 MG: 15 INJECTION INTRAVASCULAR at 15:46

## 2024-05-01 RX ADMIN — LORAZEPAM 2 MG: 2 INJECTION INTRAMUSCULAR; INTRAVENOUS at 16:02

## 2024-05-01 RX ADMIN — LORAZEPAM 4 MG: 2 INJECTION INTRAMUSCULAR; INTRAVENOUS at 16:27

## 2024-05-01 RX ADMIN — LORAZEPAM 2 MG: 2 INJECTION INTRAMUSCULAR at 16:02

## 2024-05-01 ASSESSMENT — PAIN - FUNCTIONAL ASSESSMENT: PAIN_FUNCTIONAL_ASSESSMENT: UNABLE TO SELF-REPORT

## 2024-05-01 ASSESSMENT — LIFESTYLE VARIABLES
EVER HAD A DRINK FIRST THING IN THE MORNING TO STEADY YOUR NERVES TO GET RID OF A HANGOVER: NO
EVER FELT BAD OR GUILTY ABOUT YOUR DRINKING: NO
HAVE PEOPLE ANNOYED YOU BY CRITICIZING YOUR DRINKING: NO
TOTAL SCORE: 0
HAVE YOU EVER FELT YOU SHOULD CUT DOWN ON YOUR DRINKING: NO

## 2024-05-01 NOTE — ED TRIAGE NOTES
PT had witnessed seizure in store. Per EMS, pt initially confused and then A&O x 3. + witnessed full seizure on arrival to ED. Currently with L fixed gaze and nystagmus. PAC made aware and at bedside. Obtaining IV access

## 2024-05-01 NOTE — SIGNIFICANT EVENT
I was called into this patient's room as he was dropped off by EMS seizure while actively seizing.  Did have fixed gaze to the left with tonic-clonic seizure activity movement.  He was held in safe position on the side.  IV was established by the nurse prior to me coming in and he was given 2 mg of IV Ativan.  Order of 20 mg/kg Keppra ordered.  No sign of injury from the seizure.  Oncoming ED team made aware

## 2024-05-02 ENCOUNTER — PHARMACY VISIT (OUTPATIENT)
Dept: PHARMACY | Facility: CLINIC | Age: 29
End: 2024-05-02
Payer: MEDICAID

## 2024-05-02 VITALS
TEMPERATURE: 97.3 F | SYSTOLIC BLOOD PRESSURE: 127 MMHG | WEIGHT: 176.37 LBS | RESPIRATION RATE: 16 BRPM | HEART RATE: 99 BPM | DIASTOLIC BLOOD PRESSURE: 71 MMHG | BODY MASS INDEX: 27.68 KG/M2 | OXYGEN SATURATION: 98 % | HEIGHT: 67 IN

## 2024-05-02 LAB
ATRIAL RATE: 103 BPM
HOLD SPECIMEN: NORMAL
LAMOTRIGINE SERPL-MCNC: 3.9 UG/ML (ref 2.5–15)
P AXIS: 42 DEGREES
P OFFSET: 194 MS
P ONSET: 139 MS
PR INTERVAL: 162 MS
Q ONSET: 220 MS
QRS COUNT: 17 BEATS
QRS DURATION: 90 MS
QT INTERVAL: 320 MS
QTC CALCULATION(BAZETT): 419 MS
QTC FREDERICIA: 383 MS
R AXIS: 75 DEGREES
T AXIS: 12 DEGREES
T OFFSET: 380 MS
VALPROATE SERPL-MCNC: 39 UG/ML (ref 50–100)
VENTRICULAR RATE: 103 BPM

## 2024-05-02 PROCEDURE — 36415 COLL VENOUS BLD VENIPUNCTURE: CPT | Performed by: STUDENT IN AN ORGANIZED HEALTH CARE EDUCATION/TRAINING PROGRAM

## 2024-05-02 PROCEDURE — 2500000001 HC RX 250 WO HCPCS SELF ADMINISTERED DRUGS (ALT 637 FOR MEDICARE OP): Mod: SE | Performed by: STUDENT IN AN ORGANIZED HEALTH CARE EDUCATION/TRAINING PROGRAM

## 2024-05-02 PROCEDURE — 80175 DRUG SCREEN QUAN LAMOTRIGINE: CPT | Performed by: STUDENT IN AN ORGANIZED HEALTH CARE EDUCATION/TRAINING PROGRAM

## 2024-05-02 PROCEDURE — RXMED WILLOW AMBULATORY MEDICATION CHARGE

## 2024-05-02 PROCEDURE — 80165 DIPROPYLACETIC ACID FREE: CPT | Performed by: STUDENT IN AN ORGANIZED HEALTH CARE EDUCATION/TRAINING PROGRAM

## 2024-05-02 PROCEDURE — 2500000005 HC RX 250 GENERAL PHARMACY W/O HCPCS: Mod: SE | Performed by: STUDENT IN AN ORGANIZED HEALTH CARE EDUCATION/TRAINING PROGRAM

## 2024-05-02 RX ORDER — LEVETIRACETAM 500 MG/1
1500 TABLET ORAL NIGHTLY
Qty: 90 TABLET | Refills: 0 | Status: SHIPPED | OUTPATIENT
Start: 2024-05-02 | End: 2024-06-01

## 2024-05-02 RX ORDER — VALPROIC ACID 250 MG/1
1000 CAPSULE, LIQUID FILLED ORAL NIGHTLY
Qty: 120 CAPSULE | Refills: 0 | Status: SHIPPED | OUTPATIENT
Start: 2024-05-02 | End: 2024-06-01

## 2024-05-02 RX ORDER — LAMOTRIGINE 100 MG/1
100 TABLET ORAL 2 TIMES DAILY
Qty: 60 TABLET | Refills: 1 | Status: SHIPPED | OUTPATIENT
Start: 2024-05-02 | End: 2024-07-01

## 2024-05-02 RX ORDER — LEVETIRACETAM 500 MG/1
1000 TABLET, EXTENDED RELEASE ORAL ONCE
Status: DISCONTINUED | OUTPATIENT
Start: 2024-05-02 | End: 2024-05-02

## 2024-05-02 RX ORDER — DIVALPROEX SODIUM 250 MG/1
1000 TABLET, FILM COATED, EXTENDED RELEASE ORAL DAILY
Status: DISCONTINUED | OUTPATIENT
Start: 2024-05-02 | End: 2024-05-02 | Stop reason: HOSPADM

## 2024-05-02 RX ORDER — LEVETIRACETAM 750 MG/1
1500 TABLET, EXTENDED RELEASE ORAL ONCE
Status: COMPLETED | OUTPATIENT
Start: 2024-05-02 | End: 2024-05-02

## 2024-05-02 RX ORDER — LAMOTRIGINE 25 MG/1
100 TABLET ORAL DAILY
Qty: 109 TABLET | Refills: 1 | Status: SHIPPED | OUTPATIENT
Start: 2024-05-02 | End: 2024-06-16

## 2024-05-02 RX ORDER — VALPROIC ACID 250 MG/1
1000 CAPSULE, LIQUID FILLED ORAL ONCE
Status: DISCONTINUED | OUTPATIENT
Start: 2024-05-02 | End: 2024-05-02

## 2024-05-02 RX ADMIN — LEVETIRACETAM 1500 MG: 750 TABLET, FILM COATED, EXTENDED RELEASE ORAL at 05:04

## 2024-05-02 RX ADMIN — LAMOTRIGINE 125 MG: 100 TABLET ORAL at 09:49

## 2024-05-02 RX ADMIN — DIVALPROEX SODIUM 1000 MG: 250 TABLET, EXTENDED RELEASE ORAL at 05:04

## 2024-05-02 ASSESSMENT — ACTIVITIES OF DAILY LIVING (ADL): LACK_OF_TRANSPORTATION: NO

## 2024-05-02 NOTE — PROGRESS NOTES
"   05/02/24 1119   Discharge Planning   Living Arrangements Other (Comment)   Support Systems Other (Comment);/   Type of Residence [facility]   Care Facility Name [facility]   Patient expects to be discharged to: group home   Does the patient need discharge transport arranged? Yes   RoundTrip coordination needed? Yes   What day is the transport expected? 05/02/24   What time is the transport expected? 1130   Financial Resource Strain   How hard is it for you to pay for the very basics like food, housing, medical care, and heating? Not very   Housing Stability   In the last 12 months, was there a time when you were not able to pay the mortgage or rent on time? N   In the last 12 months, how many places have you lived? 1   In the last 12 months, was there a time when you did not have a steady place to sleep or slept in a shelter (including now)? N   Transportation Needs   In the past 12 months, has lack of transportation kept you from medical appointments or from getting medications? no   In the past 12 months, has lack of transportation kept you from meetings, work, or from getting things needed for daily living? No     Dee Dee Mancuso is a 28 y.o. male on day 0 of admission presenting with No Principal Problem: There is no principal problem currently on the Problem List. Please update the Problem List and refresh..    SW consult - discharge planning     LSW was asked to speak with pt due to discharge planning and concerns about pt being medication compliant. LSW met with pt at bedside. Pt was awake and engaging with LSW. LSW asked about calling pt's dad and pt reported he wanted to call. LSW called Troy Ogden on speaker with pt present. ANABELW stated name, title and agency. Troy confirmed he is not \"dad\" but group home owner. Troy asked pt what happened and pt explained he had a seizure. Troy was familiar with where pt was at the time and asked if pt would be getting discharged. LAURA " stated she believes so, but medical team wants to know if pt is complaint with meds. Troy explained pt is compliant, however, they have had issues with Sunday. Troy explained he will leave Sunday meds out for pt due to him coming in later and pt attending [facility]. Troy will notice Monday/Tuesday that Sunday meds will never be taken. He reported they will start keeping up with it better. LSW thanked Troy. LSW asked about pt transport for discharge and Troy reported sending pt by [facility] is fine. Address is [address] [place] [address].   LSW thanked Troy and completed call. LSW updated medical provider. No further SW needs. SW will arrange transport for discharge if requested.     Cristel Watson LMSW

## 2024-05-02 NOTE — CONSULTS
"Consults    History Of Present Illness  Dee Dee Mancuso is a 28 y.o. male right handed with PMHx of epilepsy since infancy, ADHD, Depression,  preswnting with Breakthrough seizures. [facility], last seen 4/29/2024.    HPI limited as pt is a poor historian, mainly taken from chart review.     Pt states he was a [employer] today then then the last thing he remembers is that he woke up and was told he had a grand mal seizure. He felt that he was back to his normal self afterwards, did not have any abnormal feeling prior to his seizure and was at his normal state of health with out recent concerns of infectious or other symptoms. He did not bite his tongue    Per EMS/ED reports, pt was found at this store and was A&Ox4 and was not aware he had a seizure. Upon arrival to ED pt had another seizure described as GTC with left gaze and nystagmus and pt got 2mg Ativan and 1.5G keppra load. Shortly after he had another GTC requiring [id number] IV ativan.     By the time we evaluated the pt, he was a little sleepy but otherwise had normal mental status which seems close to his baseline.     ED Course:   BP: 148/89, , RR20, T 37.4.  Initial VBG: PH 7.1, Lactate >17. WBC 7.3, icarb 14, electrolytes, LFTs wnl wnl  Keppra level 10 at 1629 (Load given at 1546)  VPA level (39)  EKG Sinus tachy,   CTH: no acute intracranial process  Repeat VBG 5h later: 7.31, Lactete 0.9    ED interventions: 2mg Ativan, 1.5G Kippra load, then 6mg Ativan later on.     Epilepsy history:   - Has seizure since infacny, he was on some AED till the age of 14, he was in [place] at that time and he moved to [place] in 2014.     - Semiology from prior records includes GTCs occurring 1-2 per year, However more recently there has been descritption of episodes of spacing out when pt is stressed occurring every two weeks. Other semiology from chart review includes pt \"takes off (running), moving stuff around, grabbing things\" last " "one in .     - Pt with history of non compliance, currently lives in a group home. Was on keppra in the past that was discontinued due to mood swings but later resumed.     - Last seen his neurologist on 25. He presented on 2024 to ED after an episode c/f seizure. At that time he was on Oxcarb, depakote, keppra, he was started on lamotrigine slow titration with a final plan to discontinue keppra and uptitrate lamotrigine and VPA given additional mood stabilizing effect. When he was seen , his Oxcarb was discontinued for unclear reasons and pt was continued on uptitrating lamotrigine to 200mg BID final dose.     - Today, pt states that his typical Sz are GTCs, last seizure was \"3 weeks ago\" he was note sure about what medications he should be taking and was not sure if he took them today. We attempted calling his caregiver who he states is his step dad \"Troy Ogden  [phone] \" but unable to get answers.    - Current AEDs: VPA 750mg EC at bedtime, Keppra XR 1000mg at bedtime, Lamotrigine 100mg BID (planned uptitration to 200mg BID).    Prior EEGs:   cvEEG 2019: This EEG is indicative of a right frontal epileptic focus, as well as a right frontal dysfunction. No seizures are seen during the  recording. There is no significant change compared to prior recording.    Extended EEG 2024:   Impression:  This Routine awake EEG, performed on a patient in the awake and alert state     Epilepsy Risk Factors:   Abnormal pregnancy: No   Abnormal birth/: No   Abnormal Development: No, had an IEP but was also in regular classes throughout school   Febrile seizures, simple: Unknown  Febrile seizures, complex: Unknown   CNS infection: No   Intellectual disability: Mild  Cerebral palsy: No   Head injury (moderate/severe): No   CNS neoplasm: No   CNS malformation: No   Neurosurgical procedure: No   Stroke: No   Alcohol abuse: No   Drug abuse: No   Consanguinity: No   Family history Sz/epilepsy: Mom " had seizures that she outgrew. Reportedly maternal grandmother also had seizures.       MRI brain Epilepsy protocol 2019:   IMPRESSION:  MRI Brain:  1. Scattered areas of abnormal signal on FLAIR imaging predominately  involving the right frontal and parietal lobes and extending into the  insular and mary-insular region and also involving the left  temporoparietal lobes are nonspecific but conceivably could be  related to recent seizure activity.  These findings could also  reflect sequela from vasculitis, previous encephalitis, among others.  There is no evidence of acute ischemic injury or mass.  2. Areas of encephalomalacia in the occipital lobes, right greater  than left potentially reflect areas of remote ischemic injury.  3. The left hippocampus appears diminished in size. However, there is also slight asymmetric enlargement of the temporal horn of the rightlateral ventricle.     MRA:  1. Diminished distal branches of the right PCA. No proximal large  branch vessel cutoff is appreciated on MRA of the head.  2. No evidence of significant stenosis on MRA of the neck.    Past Medical History  No past medical history on file.  Surgical History  Past Surgical History:   Procedure Laterality Date    MR HEAD ANGIO WO IV CONTRAST  4/10/2019    MR HEAD ANGIO WO IV CONTRAST 4/10/2019 [facility]    MR NECK ANGIO WO IV CONTRAST  4/10/2019    MR NECK ANGIO WO IV CONTRAST 4/10/2019 [facility] CL[facility]ICAL [facility]     Social History  Social History     Tobacco Use    Smoking status: Unknown     Denies smoking, alcohol, drug use  Independent with ADLs. Gets help with medications.     Allergies  Patient has no known allergies.  (Not in a hospital admission)      Review of Systems  Neurological Exam  Physical Exam  GENERAL APPEARANCE:  No distress, alert, interactive and cooperative.     MENTAL STATE:   Pt was sleepy but easily arousable. Orientation was normal to time, place and person. Language testing was normal for  "comprehension, repetition, expression, and naming. Rest of mental testing limited due to pt participation.      CRANIAL NERVES:   CN 2   Visual fields full to confrontation. No clear VF deficits despite occipital stroke.  CN 3, 4, 6   Pupils round, 4 mm in diameter, equally reactive to light.   EOM full range, noted left beating nystagmus most notable on left gaze.     CN 5   Facial sensation intact bilaterally.   CN 7   Normal and symmetric facial strength. Nasolabial folds symmetric.   CN 8   Hearing intact to conversation.  CN 9/10  Palate elevates symmetrically.   CN 11   Normal strength of shoulder shrug and neck turning.   CN 12   Tongue midline, with normal bulk and strength; no fasciculations.     MOTOR:   Muscle bulk and tone were normal in both upper and lower extremities.   No fasciculations, tremor or other abnormal movements were present.                         R          L  Deltoids        5          5  Biceps          5          5  Triceps          5          5  Wrist Flex      5          5  Wrist Ext       5          5    Hip Flex         5          5  Knee Flex      5          5  Knee Ext        5          5  Dorsiflex         5          5  Plantarflex      5          5    REFLEXES:                       R          L  Biceps:         2          3  Triceps:        2          3  Knee:            2          3  Ankle:          2          3    Babinski: toes upgoing on left, downgoing on right  No clonus or other pathologic reflexes present.     SENSORY:   In both upper and lower extremities, sensation was intact to light touch    COORDINATION:    In both upper extremities, finger-nose-finger was without clear dysmetria or overshoot. But testing limited due to pt interactions.     GAIT:   Able to walk few steps un assisted with a stable gait.    Last Recorded Vitals  Blood pressure 134/73, pulse 82, temperature 36.3 °C (97.3 °F), temperature source Tympanic, resp. rate 13, height 1.702 m (5' 7\"), weight " 80 kg (176 lb 5.9 oz), SpO2 98%.    Relevant Results                    Dixon Coma Scale  Best Eye Response: To verbal stimuli  Best Verbal Response: None  Best Motor Response: Localizes pain  Dixon Coma Scale Score: 9                 I have personally reviewed the following imaging results CT head wo IV contrast    Result Date: 5/1/2024  Interpreted By:  Jayant Ruelas and Sheng Max STUDY: CT HEAD WO IV CONTRAST;  5/1/2024 6:47 pm   INDICATION: Signs/Symptoms:sz.   COMPARISON: CT head dated 01/23/2024 and MRI brain dated 04/10/2019   ACCESSION NUMBER(S): [id number]   ORDERING CLINICIAN: TERESA FISHMAN   TECHNIQUE: Axial noncontrast CT images of the head with coronal and sagittal reformatted images.   FINDINGS: CT HEAD:   Parenchyma: Redemonstration of right-greater-than-left encephalomalacia in the bilateral occipital lobes corresponding to gliosis better imaged on prior MRI brain. The grey-white differentiation is intact. There is no mass effect or midline shift. There is no intraparenchymal hemorrhage.   CSF Spaces: The ventricles, sulci and basal cisterns are within normal limits. There is no abnormal extraaxial fluid collection.   Extracranial soft tissues and calvarium: No calvarial fracture. No extracranial soft tissue abnormality.   Paranasal sinuses and mastoids: Mild bilateral anterior and posterior ethmoid air cells mucosal thickening. The mastoid air cells are patent.       No acute intracranial abnormality. Chronic findings including chronic stable posterior cerebral artery infarcts as detailed above. If acute on chronic ischemia were clinically suspected MRI would be more sensitive and specific in this regard.   I personally reviewed the images/study and I agree with the findings as stated by Dr. Hiro Tran. This study was interpreted at [place], [place].   MACRO: None.   Signed by: Jayant Ruelas 5/1/2024 7:31 PM Dictation workstation:   [id number]    XR  chest 1 view    Result Date: 5/1/2024  Interpreted By:  Fredy Zhang, STUDY: Chest dated  5/1/2024.   INDICATION: Signs/Symptoms:sz   COMPARISON: Chest dated 04/09/2019.   ACCESSION NUMBER(S): [id number]   ORDERING CLINICIAN: BENNIE CANO   TECHNIQUE: One view of the chest.   FINDINGS: The lungs are clear.  No pneumothorax or effusion is evident. The cardiomediastinal silhouette is  not enlarged.The soft tissues are grossly unremarkable.       No acute cardiopulmonary process is evident.   MACRO: None   Signed by: Fredy Zhang 5/1/2024 4:24 PM Dictation workstation:   [id number]  .      Assessment/Plan   Dee Dee Mancuso is a 28 y.o. male right handed with PMHx of epilepsy since infancy, ADHD, Depression. [facility], last seen 4/29/2024.     He is presenting with breakthrough seizures( 3 GTCs with return to baseline). Likely in the setting of non-compliance(keppra level 10 post load) v.s suboptimal regimen (low VPA level). Pt now seems back to baseline and no further work up is indicated at this time. However pt is unaware of his medication regimen and we were unable to reach his caregivers, so we would recommend pt stay in ED/CDU until a safe discharge plan can be made with clear instructions. We will also make some AED adjustments pending his LTG levels.     Current AEDs: VPA 750mg EC at bedtime, Keppra XR 1000mg at bedtime, Lamotrigine 100mg BID (planned uptitration to 200mg BID).    Recommendations:   - Increase VPA to 1000mg XR, can give now (will need meds to beds for this).  - Increase keppra to 1500mg XR (will need meds to beds for this).  - Please obtain lamotrigine level, will determine lamotrigine dosing based on level  - Recommend social work consult for safe discharge planning with clear medication/seizure instruction.   - Seizure precautions:  Do not to drive, use power tools or operate heavy machinery, and should not be on ladders. Use the shower and not the bath. Likewise, refrain  from any activity which could result in injury to themselves or others if they had a seizure or lost consciousness. These restrictions should continue until instructed by a doctor to do otherwise.  - Further recommendations to follow.      =========================  Updates recommendations:   - Lamotrigine level came back low-therapeutic 3.9  - In additional to above recommendations, would continue on lamotrigine up titration as follows:  On 5/2/2024 Take Lamictal 125mg AM, 100mg pm.  On 5/3/2024 please take Lamictal 125 mg twice daily   On 5/8/2024 take Lamictal 150 mg in am 125 mg in pm  On 5/12/2024 take Lamictal 150 mg twice daily   On 5/16/2024 Take Lamictal 175 mg in am 150 mg in pm   On 5/20/2024 Take Lamictal 175 mg twice daily   On 5/24/2024 take Lamictal 200 mg in am 175 mg in pm  After 5/27/2024 take Lamictal  200 mg twice daily. Which would be the final dose    - Would confirm with pt caregiver if they have supply for the Lamictal at home, otherwise would recommend prescribing it as well.    - Rest of recommendations as above.    Johana Fletcher  PGY2 Neurology  Epilepsy Pager: [phone]    Case will be formally staffed with attending in the AM.    Johana Fletcher MD

## 2024-05-02 NOTE — SIGNIFICANT EVENT
Epilepsy Post-Staffing Update    Dee Dee Mancuso is a 28 y.o. male right handed with PMHx of epilepsy since infancy, ADHD, Depression. [facility], last seen 4/29/2024.      He is presenting with breakthrough seizures( 3 GTCs with return to baseline). Likely in the setting of non-compliance(keppra level 10 post load) v.s suboptimal regimen (low VPA level). Pt now seems back to baseline and no further work up is indicated at this time. Will increase Keppra and Depakote given low doses with breakthrough seizures. However pt is unaware of his medication regimen and we were unable to reach his caregivers, so we would recommend pt stay in ED/CDU until a safe discharge plan can be made with clear instructions. Will also need to confirm that he has epilepsy follow up, likely to be confirmed with caregiver.    Recommendatiosn:  - Increase VPA to 1000mg XR, can give now (will need meds to beds for this).  - Increase keppra to 1500mg XR (will need meds to beds for this).  - Continue on lamotrigine at 100mg BID, do not uptitrate until epilepsy follow up  - Would confirm with pt caregiver if they have supply for the Lamictal at home, otherwise would recommend prescribing it as well.   - Recommend social work consult for safe discharge planning with clear medication/seizure instruction.   - Would confirm with caregiver to arrange for follow up in 2 weeks with [facility] neurologist. We will arrange for  epilepsy follow up as a back up.  - Can use [employer] armando for better medication compliance  - Seizure precautions:  Do not to drive, use power tools or operate heavy machinery, and should not be on ladders. Use the shower and not the bath. Likewise, refrain from any activity which could result in injury to themselves or others if they had a seizure or lost consciousness. These restrictions should continue until instructed by a doctor to do otherwise.  - Epilepsy will sign off, please page [phone] if there are any further  questions or concerns.    Jonathon Long MD  Neurology, PGY-2    Patient seen and discussed with attending physician Dr. Gupta.

## 2024-05-02 NOTE — PROGRESS NOTES
Patient was [facility] signout, please see the previous providers note for more detail H&P.  Briefly, patient is a 28-year-old male with past medical history of epilepsy who presented to the ED with concern for breakthrough seizure.  At the time of signout, patient was pending neurology final recommendations for Lamictal (Keppra had already been prescribed by prior team), meds to beds and social work consult for concern for housing situation and ability to obtain medications.      Under my care, neurology recommended a Lamictal taper initially and this was called into pharmacy.  Following meds arriving, they changed to 200 twice daily and the patient was made aware of this change and not to follow the labels on these 2 prescriptions.  Social work saw the patient and after discussing the case with his group home, he does have a  and he was made aware.  Social work felt comfortable at this point with discharge and the amount of support he was getting.  He was discharged from emergency department in stable condition.      This patient was discussed with Dr. Jonas who agrees.      Chuhco Caicedo MD  Emergency Medicine, PGY3

## 2024-05-03 ENCOUNTER — HOSPITAL ENCOUNTER (EMERGENCY)
Facility: HOSPITAL | Age: 29
Discharge: OTHER INSTITUTION | End: 2024-05-04
Attending: EMERGENCY MEDICINE
Payer: COMMERCIAL

## 2024-05-03 VITALS
TEMPERATURE: 98.4 F | BODY MASS INDEX: 27.68 KG/M2 | RESPIRATION RATE: 16 BRPM | HEART RATE: 81 BPM | OXYGEN SATURATION: 95 % | HEIGHT: 67 IN | WEIGHT: 176.37 LBS | SYSTOLIC BLOOD PRESSURE: 135 MMHG | DIASTOLIC BLOOD PRESSURE: 86 MMHG

## 2024-05-03 DIAGNOSIS — T22.312A: Primary | ICD-10-CM

## 2024-05-03 LAB
SCAN RESULT: ABNORMAL
VALPROATE FREE SERPL-MCNC: 2.4 UG/ML (ref 4–30)

## 2024-05-03 PROCEDURE — 99283 EMERGENCY DEPT VISIT LOW MDM: CPT

## 2024-05-03 PROCEDURE — 99285 EMERGENCY DEPT VISIT HI MDM: CPT

## 2024-05-03 PROCEDURE — 99285 EMERGENCY DEPT VISIT HI MDM: CPT | Performed by: EMERGENCY MEDICINE

## 2024-05-03 PROCEDURE — 16020 DRESS/DEBRID P-THICK BURN S: CPT

## 2024-05-03 PROCEDURE — 16020 DRESS/DEBRID P-THICK BURN S: CPT | Performed by: EMERGENCY MEDICINE

## 2024-05-03 PROCEDURE — 2500000001 HC RX 250 WO HCPCS SELF ADMINISTERED DRUGS (ALT 637 FOR MEDICARE OP): Mod: SE

## 2024-05-03 RX ORDER — BACITRACIN ZINC 500 UNIT/G
OINTMENT IN PACKET (EA) TOPICAL ONCE
Status: COMPLETED | OUTPATIENT
Start: 2024-05-03 | End: 2024-05-03

## 2024-05-03 RX ORDER — BACITRACIN ZINC 500 UNIT/G
OINTMENT IN PACKET (EA) TOPICAL
Status: COMPLETED
Start: 2024-05-03 | End: 2024-05-03

## 2024-05-03 RX ADMIN — BACITRACIN 1 APPLICATION: 500 OINTMENT TOPICAL at 22:45

## 2024-05-03 RX ADMIN — Medication 1 APPLICATION: at 22:45

## 2024-05-03 ASSESSMENT — PAIN DESCRIPTION - PAIN TYPE: TYPE: ACUTE PAIN

## 2024-05-03 ASSESSMENT — COLUMBIA-SUICIDE SEVERITY RATING SCALE - C-SSRS
2. HAVE YOU ACTUALLY HAD ANY THOUGHTS OF KILLING YOURSELF?: NO
1. IN THE PAST MONTH, HAVE YOU WISHED YOU WERE DEAD OR WISHED YOU COULD GO TO SLEEP AND NOT WAKE UP?: NO
6. HAVE YOU EVER DONE ANYTHING, STARTED TO DO ANYTHING, OR PREPARED TO DO ANYTHING TO END YOUR LIFE?: NO

## 2024-05-03 ASSESSMENT — PAIN DESCRIPTION - ORIENTATION: ORIENTATION: LEFT

## 2024-05-03 ASSESSMENT — PAIN DESCRIPTION - LOCATION: LOCATION: ARM

## 2024-05-03 ASSESSMENT — PAIN SCALES - GENERAL: PAINLEVEL_OUTOF10: 8

## 2024-05-03 ASSESSMENT — PAIN - FUNCTIONAL ASSESSMENT: PAIN_FUNCTIONAL_ASSESSMENT: 0-10
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
## 2024-06-13 ENCOUNTER — CLINICAL SUPPORT (OUTPATIENT)
Dept: EMERGENCY MEDICINE | Facility: HOSPITAL | Age: 29
End: 2024-06-13
Payer: COMMERCIAL

## 2024-06-13 ENCOUNTER — HOSPITAL ENCOUNTER (EMERGENCY)
Facility: HOSPITAL | Age: 29
Discharge: HOME | End: 2024-06-13
Attending: EMERGENCY MEDICINE
Payer: COMMERCIAL

## 2024-06-13 ENCOUNTER — APPOINTMENT (OUTPATIENT)
Dept: RADIOLOGY | Facility: HOSPITAL | Age: 29
End: 2024-06-13
Payer: COMMERCIAL

## 2024-06-13 VITALS
OXYGEN SATURATION: 95 % | DIASTOLIC BLOOD PRESSURE: 71 MMHG | BODY MASS INDEX: 29.02 KG/M2 | RESPIRATION RATE: 16 BRPM | HEIGHT: 64 IN | TEMPERATURE: 98.7 F | HEART RATE: 89 BPM | SYSTOLIC BLOOD PRESSURE: 115 MMHG | WEIGHT: 170 LBS

## 2024-06-13 DIAGNOSIS — D72.819 LEUKOPENIA, UNSPECIFIED TYPE: ICD-10-CM

## 2024-06-13 DIAGNOSIS — D64.9 ANEMIA, UNSPECIFIED TYPE: ICD-10-CM

## 2024-06-13 DIAGNOSIS — G40.909 NONINTRACTABLE EPILEPSY WITHOUT STATUS EPILEPTICUS, UNSPECIFIED EPILEPSY TYPE (MULTI): ICD-10-CM

## 2024-06-13 DIAGNOSIS — G40.919 BREAKTHROUGH SEIZURE (MULTI): Primary | ICD-10-CM

## 2024-06-13 LAB
ALBUMIN SERPL BCP-MCNC: 4.8 G/DL (ref 3.4–5)
ALP SERPL-CCNC: 38 U/L (ref 33–120)
ALT SERPL W P-5'-P-CCNC: 9 U/L (ref 10–52)
ANION GAP BLDV CALCULATED.4IONS-SCNC: ABNORMAL MMOL/L
ANION GAP SERPL CALC-SCNC: 16 MMOL/L (ref 10–20)
AST SERPL W P-5'-P-CCNC: 15 U/L (ref 9–39)
ATRIAL RATE: 76 BPM
BASE EXCESS BLDV CALC-SCNC: 3.9 MMOL/L (ref -2–3)
BASOPHILS # BLD AUTO: 0.04 X10*3/UL (ref 0–0.1)
BASOPHILS NFR BLD AUTO: 1 %
BILIRUB SERPL-MCNC: 0.3 MG/DL (ref 0–1.2)
BODY TEMPERATURE: 37 DEGREES CELSIUS
BUN SERPL-MCNC: 20 MG/DL (ref 6–23)
CA-I BLDV-SCNC: 1.23 MMOL/L (ref 1.1–1.33)
CALCIUM SERPL-MCNC: 9.7 MG/DL (ref 8.6–10.6)
CHLORIDE BLDV-SCNC: ABNORMAL MMOL/L
CHLORIDE SERPL-SCNC: 105 MMOL/L (ref 98–107)
CO2 SERPL-SCNC: 27 MMOL/L (ref 21–32)
CREAT SERPL-MCNC: 1.16 MG/DL (ref 0.5–1.3)
EGFRCR SERPLBLD CKD-EPI 2021: 89 ML/MIN/1.73M*2
EOSINOPHIL # BLD AUTO: 0.07 X10*3/UL (ref 0–0.7)
EOSINOPHIL NFR BLD AUTO: 1.8 %
ERYTHROCYTE [DISTWIDTH] IN BLOOD BY AUTOMATED COUNT: 14.1 % (ref 11.5–14.5)
GLUCOSE BLDV-MCNC: 89 MG/DL (ref 74–99)
GLUCOSE SERPL-MCNC: 89 MG/DL (ref 74–99)
HCO3 BLDV-SCNC: 29.2 MMOL/L (ref 22–26)
HCT VFR BLD AUTO: 34 % (ref 41–52)
HCT VFR BLD EST: 34 % (ref 41–52)
HGB BLD-MCNC: 11.1 G/DL (ref 13.5–17.5)
HGB BLDV-MCNC: 11.3 G/DL (ref 13.5–17.5)
IMM GRANULOCYTES # BLD AUTO: 0.01 X10*3/UL (ref 0–0.7)
IMM GRANULOCYTES NFR BLD AUTO: 0.3 % (ref 0–0.9)
LACTATE BLDV-SCNC: 2 MMOL/L (ref 0.4–2)
LEVETIRACETAM SERPL-MCNC: <2 UG/ML (ref 10–40)
LYMPHOCYTES # BLD AUTO: 1.3 X10*3/UL (ref 1.2–4.8)
LYMPHOCYTES NFR BLD AUTO: 33.2 %
MCH RBC QN AUTO: 26.2 PG (ref 26–34)
MCHC RBC AUTO-ENTMCNC: 32.6 G/DL (ref 32–36)
MCV RBC AUTO: 80 FL (ref 80–100)
MONOCYTES # BLD AUTO: 0.3 X10*3/UL (ref 0.1–1)
MONOCYTES NFR BLD AUTO: 7.7 %
NEUTROPHILS # BLD AUTO: 2.2 X10*3/UL (ref 1.2–7.7)
NEUTROPHILS NFR BLD AUTO: 56 %
NRBC BLD-RTO: 0 /100 WBCS (ref 0–0)
OXYHGB MFR BLDV: 87.3 % (ref 45–75)
P AXIS: 42 DEGREES
P OFFSET: 175 MS
P ONSET: 126 MS
PCO2 BLDV: 46 MM HG (ref 41–51)
PH BLDV: 7.41 PH (ref 7.33–7.43)
PLATELET # BLD AUTO: 271 X10*3/UL (ref 150–450)
PO2 BLDV: 59 MM HG (ref 35–45)
POTASSIUM BLDV-SCNC: 4.1 MMOL/L (ref 3.5–5.3)
POTASSIUM SERPL-SCNC: 4 MMOL/L (ref 3.5–5.3)
PR INTERVAL: 190 MS
PROT SERPL-MCNC: 7 G/DL (ref 6.4–8.2)
Q ONSET: 221 MS
QRS COUNT: 12 BEATS
QRS DURATION: 82 MS
QT INTERVAL: 348 MS
QTC CALCULATION(BAZETT): 391 MS
QTC FREDERICIA: 376 MS
R AXIS: 64 DEGREES
RBC # BLD AUTO: 4.24 X10*6/UL (ref 4.5–5.9)
SAO2 % BLDV: 89 % (ref 45–75)
SODIUM BLDV-SCNC: 142 MMOL/L (ref 136–145)
SODIUM SERPL-SCNC: 144 MMOL/L (ref 136–145)
T AXIS: 11 DEGREES
T OFFSET: 395 MS
VALPROATE SERPL-MCNC: 18 UG/ML (ref 50–100)
VENTRICULAR RATE: 76 BPM
WBC # BLD AUTO: 3.9 X10*3/UL (ref 4.4–11.3)

## 2024-06-13 PROCEDURE — 2500000004 HC RX 250 GENERAL PHARMACY W/ HCPCS (ALT 636 FOR OP/ED): Mod: SE | Performed by: EMERGENCY MEDICINE

## 2024-06-13 PROCEDURE — 93005 ELECTROCARDIOGRAM TRACING: CPT

## 2024-06-13 PROCEDURE — 70450 CT HEAD/BRAIN W/O DYE: CPT | Performed by: RADIOLOGY

## 2024-06-13 PROCEDURE — 36415 COLL VENOUS BLD VENIPUNCTURE: CPT | Performed by: EMERGENCY MEDICINE

## 2024-06-13 PROCEDURE — 85025 COMPLETE CBC W/AUTO DIFF WBC: CPT | Performed by: EMERGENCY MEDICINE

## 2024-06-13 PROCEDURE — 99285 EMERGENCY DEPT VISIT HI MDM: CPT | Performed by: EMERGENCY MEDICINE

## 2024-06-13 PROCEDURE — 80164 ASSAY DIPROPYLACETIC ACD TOT: CPT | Performed by: EMERGENCY MEDICINE

## 2024-06-13 PROCEDURE — 84132 ASSAY OF SERUM POTASSIUM: CPT

## 2024-06-13 PROCEDURE — 70450 CT HEAD/BRAIN W/O DYE: CPT

## 2024-06-13 PROCEDURE — 84132 ASSAY OF SERUM POTASSIUM: CPT | Performed by: EMERGENCY MEDICINE

## 2024-06-13 PROCEDURE — 99285 EMERGENCY DEPT VISIT HI MDM: CPT | Mod: 25

## 2024-06-13 PROCEDURE — 96374 THER/PROPH/DIAG INJ IV PUSH: CPT

## 2024-06-13 PROCEDURE — 80177 DRUG SCRN QUAN LEVETIRACETAM: CPT | Performed by: EMERGENCY MEDICINE

## 2024-06-13 RX ORDER — LEVETIRACETAM 500 MG/1
1500 TABLET, EXTENDED RELEASE ORAL DAILY
Qty: 90 TABLET | Refills: 11 | Status: SHIPPED | OUTPATIENT
Start: 2024-06-13 | End: 2024-06-13

## 2024-06-13 RX ORDER — VALPROIC ACID 250 MG/1
1000 CAPSULE, LIQUID FILLED ORAL DAILY
Qty: 120 CAPSULE | Refills: 11 | Status: SHIPPED | OUTPATIENT
Start: 2024-06-13 | End: 2024-06-13

## 2024-06-13 RX ORDER — LEVETIRACETAM 10 MG/ML
1000 INJECTION INTRAVASCULAR ONCE
Status: COMPLETED | OUTPATIENT
Start: 2024-06-13 | End: 2024-06-13

## 2024-06-13 RX ORDER — VALPROIC ACID 250 MG/1
1000 CAPSULE, LIQUID FILLED ORAL DAILY
Qty: 120 CAPSULE | Refills: 0 | Status: SHIPPED | OUTPATIENT
Start: 2024-06-13 | End: 2024-09-10 | Stop reason: ALTCHOICE

## 2024-06-13 RX ORDER — LEVETIRACETAM 500 MG/1
1500 TABLET, EXTENDED RELEASE ORAL DAILY
Qty: 90 TABLET | Refills: 0 | Status: SHIPPED | OUTPATIENT
Start: 2024-06-13 | End: 2024-09-11 | Stop reason: ALTCHOICE

## 2024-06-13 RX ADMIN — LEVETIRACETAM 1000 MG: 10 INJECTION INTRAVENOUS at 20:29

## 2024-06-13 RX ADMIN — LEVETIRACETAM 1000 MG: 10 INJECTION INTRAVENOUS at 20:43

## 2024-06-13 ASSESSMENT — COLUMBIA-SUICIDE SEVERITY RATING SCALE - C-SSRS
6. HAVE YOU EVER DONE ANYTHING, STARTED TO DO ANYTHING, OR PREPARED TO DO ANYTHING TO END YOUR LIFE?: NO
1. IN THE PAST MONTH, HAVE YOU WISHED YOU WERE DEAD OR WISHED YOU COULD GO TO SLEEP AND NOT WAKE UP?: NO
2. HAVE YOU ACTUALLY HAD ANY THOUGHTS OF KILLING YOURSELF?: NO

## 2024-06-13 NOTE — ED PROVIDER NOTES
HPI   Chief Complaint   Patient presents with    Seizures       This is a 28 year old man who presents via EMS after a witnessed seizure like episode. Patient states he has a history of epilepsy and takes Keppra for this. Denies taking other antiepileptics. States he does have breakthrough seizures on occasion, unsure when his most recent one was but states it was within the past 1-2 months. He states that he was in his usual state of health until shortly prior to presentation when he began having dull, generalized headache. Headache was progressive from initially mild to severe. He states that this is his usual seizure prodrome. He then states that he woke up with EMS present and was told he fell down, stiffened up, and had shaking movements of his extremities. He states this is how his seizures usually occur. He does feel mildly fatigued, which he also states is normal after a seizure. He is denying any headache or head pain at this time. No neck pain. No visual changes. No incontinence. No weakness, numbness, tingling in his extremities. Has been able to ambulate since this incident. Denying pain in any other location. No recent fevers, chills, cough, rhinorrhea/congestion, chest pain, abdominal pain, nausea, vomiting, diarrhea, urinary symptoms. Patient states he feels back to baseline and is requesting to leave. Has Keppra at home and does not believe he has missed any doses. No additional concerns.      History provided by:  Patient and EMS personnel   used: No                        Neela Coma Scale Score: 15                     Patient History   No past medical history on file.  Past Surgical History:   Procedure Laterality Date    MR HEAD ANGIO WO IV CONTRAST  4/10/2019    MR HEAD ANGIO WO IV CONTRAST 4/10/2019 [facility] CLINICAL LEGACY    MR NECK ANGIO WO IV CONTRAST  4/10/2019    MR NECK ANGIO WO IV CONTRAST 4/10/2019 [facility] CLINICAL LEGACY     No family history on file.  Social History      Tobacco Use    Smoking status: Unknown    Smokeless tobacco: Not on file   Substance Use Topics    Alcohol use: Not on file    Drug use: Not on file       Physical Exam   ED Triage Vitals [06/13/24 1723]   Temperature Heart Rate Respirations BP   37.1 °C (98.7 °F) 89 16 115/71      Pulse Ox Temp Source Heart Rate Source Patient Position   95 % Oral Monitor --      BP Location FiO2 (%)     -- --       Physical Exam  Constitutional:       General: He is not in acute distress.  HENT:      Head: Normocephalic and atraumatic.      Right Ear: External ear normal.      Left Ear: External ear normal.      Nose: Nose normal. No rhinorrhea.      Mouth/Throat:      Mouth: Mucous membranes are moist.      Pharynx: Oropharynx is clear.      Comments: No tongue lacerations or other intraoral lesions, dentition stable  Eyes:      Extraocular Movements: Extraocular movements intact.      Conjunctiva/sclera: Conjunctivae normal.      Pupils: Pupils are equal, round, and reactive to light.   Cardiovascular:      Rate and Rhythm: Normal rate and regular rhythm.      Comments: Extremities warm and well perfused, no lower extremity edema appreciated  Pulmonary:      Effort: Pulmonary effort is normal. No respiratory distress.      Breath sounds: Normal breath sounds.   Abdominal:      General: Abdomen is flat. There is no distension.      Palpations: Abdomen is soft.   Musculoskeletal:         General: No swelling or deformity. Normal range of motion.      Cervical back: Normal range of motion and neck supple. No rigidity or tenderness.      Comments: Moving all extremities equally, no deformity   Skin:     General: Skin is warm and dry.   Neurological:      General: No focal deficit present.      Mental Status: He is alert and oriented to person, place, and time. Mental status is at baseline.      Comments: CN II-XII intact, normal facial symmetry, answering questions appropriately and following commands, moving all extremities  equally   Psychiatric:         Mood and Affect: Mood normal.         Behavior: Behavior normal.       Labs Reviewed   CBC WITH AUTO DIFFERENTIAL - Abnormal       Result Value    WBC 3.9 (*)     nRBC 0.0      RBC 4.24 (*)     Hemoglobin 11.1 (*)     Hematocrit 34.0 (*)     MCV 80      MCH 26.2      MCHC 32.6      RDW 14.1      Platelets 271      Neutrophils % 56.0      Immature Granulocytes %, Automated 0.3      Lymphocytes % 33.2      Monocytes % 7.7      Eosinophils % 1.8      Basophils % 1.0      Neutrophils Absolute 2.20      Immature Granulocytes Absolute, Automated 0.01      Lymphocytes Absolute 1.30      Monocytes Absolute 0.30      Eosinophils Absolute 0.07      Basophils Absolute 0.04     COMPREHENSIVE METABOLIC PANEL - Abnormal    Glucose 89      Sodium 144      Potassium 4.0      Chloride 105      Bicarbonate 27      Anion Gap 16      Urea Nitrogen 20      Creatinine 1.16      eGFR 89      Calcium 9.7      Albumin 4.8      Alkaline Phosphatase 38      Total Protein 7.0      AST 15      Bilirubin, Total 0.3      ALT 9 (*)    LEVETIRACETAM - Abnormal    Keppra <2 (*)     Narrative:     Brivaracetam may falsely increase the amount of Levetiracetam measured by this method. Serum levels should be confirmed by a valid chromatographic method  for patients with these drugs co-present in circulation.   BLOOD GAS VENOUS FULL PANEL UNSOLICITED - Abnormal    POCT pH, Venous 7.41      POCT pCO2, Venous 46      POCT pO2, Venous 59 (*)     POCT SO2, Venous 89 (*)     POCT Oxy Hemoglobin, Venous 87.3 (*)     POCT Hematocrit Calculated, Venous 34.0 (*)     POCT Sodium, Venous 142      POCT Potassium, Venous 4.1      POCT Chloride, Venous        POCT Ionized Calicum, Venous 1.23      POCT Glucose, Venous 89      POCT Lactate, Venous 2.0      POCT Base Excess, Venous 3.9 (*)     POCT HCO3 Calculated, Venous 29.2 (*)     POCT Hemoglobin, Venous 11.3 (*)     POCT Anion Gap, Venous        Patient Temperature 37.0     VALPROIC  ACID     CT head wo IV contrast   Final Result   1. No acute intracranial hemorrhage, mass effect, or CT apparent   acute infarct.   2. Encephalomalacia within the bilateral occipital lobes similar to   prior exams.        I personally reviewed the images/study and I agree with the findings   as stated above by resident physician, Chandan Rios MD.        MACRO:   None.        Signed by: Chente Ethan 6/13/2024 8:22 PM   Dictation workstation:   [id number]          ED Course & MDM   Diagnoses as of 06/13/24 1920   Breakthrough seizure (Multi)   Nonintractable epilepsy without status epilepticus, unspecified epilepsy type (Multi)   Anemia, unspecified type   Leukopenia, unspecified type       Medical Decision Making  Patient presented as above. On exam he is awake, alert, in no acute distress. Vital signs are normal. He has a normal neurologic exam with no focal deficits. Cervical spine is able to be clinically cleared by myself at bedside. EKG shows sinus rhythm with normal intervals, no evidence of dysrhythmia. Given concern for possible head injury, did obtain CT head. CT head shows no evidence of acute traumatic process, no other acute process. No focal infectious source identified on exam. Labs obtained, showing mild anemia, borderline leukopenia, normal differential. Metabolic panel is normal, specifically no electrolyte derangements. Levetiracetam level returned undetectable. Feel that this is most likely cause of his breakthrough seizure today and no other acute issue identified. Patient given IV loading dose. Did verify most recently was receiving 1500 mg extended release daily, and patient provided with prescription for this. Did later find documentation that patient had previously been on lamotrigine and valproate, patient denies being on these recently. Was able to add valproate level, which returned positive but subtherapeutic. Patient given valproate and prescription. Patient self-discontinued IV and  I am unable to add lamotrigine level to labs. Have held off on administering or prescribing lamotrigine given patient states he does not take this and will require uptitration and close follow up to restart. Unclear if this is for epilepsy. Patient had no further seizure activity and is at baseline in the ED. He is requesting to be discharged. Patient is appropriate for outpatient follow up with Neurology. Counseled on importance of taking his antiepileptics. Advised to follow up with PCP to follow blood counts. Return precautions provided. Patient verbalized understanding of all information given, all questions were answered and he is discharged in stable condition.    Procedure  ECG 12 lead    Performed by: Shahid Roberts MD  Authorized by: Shahid Roberts MD    ECG interpreted by ED Physician in the absence of a cardiologist: yes    Previous ECG:     Previous ECG:  Compared to current    Similarity:  No change  Comments:      Per my interpretation, EKG shows normal sinus rhythm at a rate of 76 bpm.  Axis is 64 degrees.  MA interval is 190 ms, QRS is 82 ms, QTc is 391 ms.  No ST segment elevation or depression.  When compared to EKG from 5/1/2024, rate has decreased from 103 bpm, no other significant changes are appreciated.       Shahid Roberts MD  06/13/24 0536
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
## 2024-06-14 NOTE — DISCHARGE INSTRUCTIONS
You were seen in the emergency department today after a breakthrough seizure. Your Keppra (levetiracetam) level was found to be very low. You were given a dose of Keppra. Your Depakote (valproic acid) level was also low and you were given a dose of this. You should continue taking your medications as prescribed and follow up with your Neurologist, or else call to set up appointment with a new Neurologist. You were found to have slightly low blood counts. You should follow up with a primary care doctor to have your blood counts checked.

## 2024-09-08 ENCOUNTER — HOSPITAL ENCOUNTER (EMERGENCY)
Facility: HOSPITAL | Age: 29
Discharge: HOME | DRG: 101 | End: 2024-09-08
Attending: STUDENT IN AN ORGANIZED HEALTH CARE EDUCATION/TRAINING PROGRAM
Payer: COMMERCIAL

## 2024-09-08 ENCOUNTER — APPOINTMENT (OUTPATIENT)
Dept: RADIOLOGY | Facility: HOSPITAL | Age: 29
DRG: 101 | End: 2024-09-08
Payer: COMMERCIAL

## 2024-09-08 VITALS
DIASTOLIC BLOOD PRESSURE: 78 MMHG | SYSTOLIC BLOOD PRESSURE: 134 MMHG | HEART RATE: 81 BPM | RESPIRATION RATE: 18 BRPM | WEIGHT: 175 LBS | TEMPERATURE: 97.2 F | BODY MASS INDEX: 29.88 KG/M2 | OXYGEN SATURATION: 94 % | HEIGHT: 64 IN

## 2024-09-08 DIAGNOSIS — R56.9 SEIZURE (MULTI): Primary | ICD-10-CM

## 2024-09-08 LAB
ALBUMIN SERPL BCP-MCNC: 4.8 G/DL (ref 3.4–5)
ALP SERPL-CCNC: 42 U/L (ref 33–120)
ALT SERPL W P-5'-P-CCNC: 17 U/L (ref 10–52)
ANION GAP SERPL CALC-SCNC: 19 MMOL/L (ref 10–20)
AST SERPL W P-5'-P-CCNC: 18 U/L (ref 9–39)
BASOPHILS # BLD AUTO: 0.03 X10*3/UL (ref 0–0.1)
BASOPHILS NFR BLD AUTO: 0.4 %
BILIRUB SERPL-MCNC: 0.3 MG/DL (ref 0–1.2)
BUN SERPL-MCNC: 22 MG/DL (ref 6–23)
CALCIUM SERPL-MCNC: 9.5 MG/DL (ref 8.6–10.6)
CHLORIDE SERPL-SCNC: 102 MMOL/L (ref 98–107)
CO2 SERPL-SCNC: 23 MMOL/L (ref 21–32)
CREAT SERPL-MCNC: 0.97 MG/DL (ref 0.5–1.3)
EGFRCR SERPLBLD CKD-EPI 2021: >90 ML/MIN/1.73M*2
EOSINOPHIL # BLD AUTO: 0.04 X10*3/UL (ref 0–0.7)
EOSINOPHIL NFR BLD AUTO: 0.6 %
ERYTHROCYTE [DISTWIDTH] IN BLOOD BY AUTOMATED COUNT: 20.4 % (ref 11.5–14.5)
GLUCOSE BLD MANUAL STRIP-MCNC: 110 MG/DL (ref 74–99)
GLUCOSE SERPL-MCNC: 101 MG/DL (ref 74–99)
HCT VFR BLD AUTO: 38.2 % (ref 41–52)
HGB BLD-MCNC: 12.3 G/DL (ref 13.5–17.5)
IMM GRANULOCYTES # BLD AUTO: 0.02 X10*3/UL (ref 0–0.7)
IMM GRANULOCYTES NFR BLD AUTO: 0.3 % (ref 0–0.9)
LAMOTRIGINE SERPL-MCNC: 12.3 UG/ML (ref 2.5–15)
LEVETIRACETAM SERPL-MCNC: <2 UG/ML (ref 10–40)
LYMPHOCYTES # BLD AUTO: 0.64 X10*3/UL (ref 1.2–4.8)
LYMPHOCYTES NFR BLD AUTO: 9.4 %
MCH RBC QN AUTO: 23.5 PG (ref 26–34)
MCHC RBC AUTO-ENTMCNC: 32.2 G/DL (ref 32–36)
MCV RBC AUTO: 73 FL (ref 80–100)
MONOCYTES # BLD AUTO: 0.57 X10*3/UL (ref 0.1–1)
MONOCYTES NFR BLD AUTO: 8.3 %
NEUTROPHILS # BLD AUTO: 5.53 X10*3/UL (ref 1.2–7.7)
NEUTROPHILS NFR BLD AUTO: 81 %
NRBC BLD-RTO: 0 /100 WBCS (ref 0–0)
PLATELET # BLD AUTO: 202 X10*3/UL (ref 150–450)
POTASSIUM SERPL-SCNC: 3.7 MMOL/L (ref 3.5–5.3)
PROT SERPL-MCNC: 7.4 G/DL (ref 6.4–8.2)
RBC # BLD AUTO: 5.23 X10*6/UL (ref 4.5–5.9)
RBC MORPH BLD: NORMAL
SODIUM SERPL-SCNC: 140 MMOL/L (ref 136–145)
WBC # BLD AUTO: 6.8 X10*3/UL (ref 4.4–11.3)

## 2024-09-08 PROCEDURE — 70450 CT HEAD/BRAIN W/O DYE: CPT

## 2024-09-08 PROCEDURE — 99284 EMERGENCY DEPT VISIT MOD MDM: CPT | Mod: 25

## 2024-09-08 PROCEDURE — 85025 COMPLETE CBC W/AUTO DIFF WBC: CPT | Performed by: STUDENT IN AN ORGANIZED HEALTH CARE EDUCATION/TRAINING PROGRAM

## 2024-09-08 PROCEDURE — 36415 COLL VENOUS BLD VENIPUNCTURE: CPT

## 2024-09-08 PROCEDURE — 80165 DIPROPYLACETIC ACID FREE: CPT

## 2024-09-08 PROCEDURE — 2500000004 HC RX 250 GENERAL PHARMACY W/ HCPCS (ALT 636 FOR OP/ED): Performed by: STUDENT IN AN ORGANIZED HEALTH CARE EDUCATION/TRAINING PROGRAM

## 2024-09-08 PROCEDURE — 80177 DRUG SCRN QUAN LEVETIRACETAM: CPT

## 2024-09-08 PROCEDURE — 80053 COMPREHEN METABOLIC PANEL: CPT | Performed by: STUDENT IN AN ORGANIZED HEALTH CARE EDUCATION/TRAINING PROGRAM

## 2024-09-08 PROCEDURE — 99285 EMERGENCY DEPT VISIT HI MDM: CPT | Performed by: STUDENT IN AN ORGANIZED HEALTH CARE EDUCATION/TRAINING PROGRAM

## 2024-09-08 PROCEDURE — 82947 ASSAY GLUCOSE BLOOD QUANT: CPT

## 2024-09-08 PROCEDURE — 96374 THER/PROPH/DIAG INJ IV PUSH: CPT

## 2024-09-08 PROCEDURE — 70450 CT HEAD/BRAIN W/O DYE: CPT | Performed by: STUDENT IN AN ORGANIZED HEALTH CARE EDUCATION/TRAINING PROGRAM

## 2024-09-08 PROCEDURE — 80175 DRUG SCREEN QUAN LAMOTRIGINE: CPT

## 2024-09-08 RX ORDER — LEVETIRACETAM 500 MG/1
1500 TABLET, EXTENDED RELEASE ORAL DAILY
Qty: 90 TABLET | Refills: 11 | Status: SHIPPED | OUTPATIENT
Start: 2024-09-08 | End: 2025-09-08

## 2024-09-08 RX ORDER — LEVETIRACETAM 15 MG/ML
1500 INJECTION INTRAVASCULAR ONCE
Status: COMPLETED | OUTPATIENT
Start: 2024-09-08 | End: 2024-09-08

## 2024-09-08 RX ADMIN — LEVETIRACETAM 1500 MG: 15 INJECTION INTRAVENOUS at 04:00

## 2024-09-08 ASSESSMENT — LIFESTYLE VARIABLES
EVER HAD A DRINK FIRST THING IN THE MORNING TO STEADY YOUR NERVES TO GET RID OF A HANGOVER: NO
HAVE PEOPLE ANNOYED YOU BY CRITICIZING YOUR DRINKING: NO
HAVE YOU EVER FELT YOU SHOULD CUT DOWN ON YOUR DRINKING: NO
TOTAL SCORE: 0
EVER FELT BAD OR GUILTY ABOUT YOUR DRINKING: NO

## 2024-09-08 ASSESSMENT — PAIN - FUNCTIONAL ASSESSMENT: PAIN_FUNCTIONAL_ASSESSMENT: 0-10

## 2024-09-08 ASSESSMENT — PAIN SCALES - GENERAL: PAINLEVEL_OUTOF10: 6

## 2024-09-08 NOTE — DISCHARGE INSTRUCTIONS
You were seen today because you had a seizure.  You do not have a therapeutic Keppra level.  You should take your Keppra.  You should also take your lamotrigine, which should.  You have been.  You should take your other medications as well.  Additionally, I would recommend that you follow-up with your seizure doctor if you are continually having seizures despite your medications.  Addition, I will send you home with a prescription for the Keppra so that you have it.  I will also put in a referral for neurology in case you need a new neurologist.

## 2024-09-08 NOTE — ED TRIAGE NOTES
PT to the ED for seizure, pt's father called 911. PT awake and alert upon arrival. PT reports that he is compliant with his seizure medication, keppra.

## 2024-09-09 NOTE — ED PROVIDER NOTES
History of Present Illness     History provided by: Patient and EMS  Limitations to History: None  External Records Reviewed with Brief Summary: None    HPI:  Dee eDe Mancuso is a 29 y.o. male past medical history of epilepsy currently on Lamictal Keppra and Depakote who presents today for seizure. Patient states that he believes he has been taking his medications as they are prescribed, however, is not quite sure. He does endorse that he had a seizure, is unsure if anybody witnessed it.  Per EMS, 911 was called by his father who reported seeing the seizure.  Patient denies any head strike, LOC. Denies any biting of his tongue, but is unsure why he has blood on his face. He denies any chest pain or shortness of breath, numbness weakness tingling in his arms or legs. Denies any alcohol or substance use.    Physical Exam   Triage vitals:  T 36.2 °C (97.2 °F)  HR 96  /75  RR 16  O2 95 %      Physical Exam  Constitutional:       General: He is not in acute distress.     Appearance: Normal appearance. He is not ill-appearing or diaphoretic.   HENT:      Head: Normocephalic.      Comments: Head otherwise atraumatic, however, dried blood across right side of face to right ear, no evidence of laceration or abrasion to the auricle or pinna     Mouth/Throat:      Mouth: Mucous membranes are moist.      Pharynx: No oropharyngeal exudate or posterior oropharyngeal erythema.   Eyes:      General: No scleral icterus.     Extraocular Movements: Extraocular movements intact.      Pupils: Pupils are equal, round, and reactive to light.   Cardiovascular:      Rate and Rhythm: Normal rate and regular rhythm.      Pulses: Normal pulses.      Heart sounds: Normal heart sounds. No murmur heard.     No gallop.   Pulmonary:      Effort: Pulmonary effort is normal. No respiratory distress.      Breath sounds: Normal breath sounds. No stridor. No wheezing, rhonchi or rales.   Abdominal:      General: Bowel sounds are normal. There  is no distension.      Palpations: Abdomen is soft. There is no mass.      Tenderness: There is no abdominal tenderness.      Hernia: No hernia is present.   Musculoskeletal:         General: No swelling, deformity or signs of injury. Normal range of motion.      Cervical back: Normal range of motion and neck supple. No tenderness.   Skin:     General: Skin is warm.      Capillary Refill: Capillary refill takes less than 2 seconds.      Findings: No erythema, lesion or rash.   Neurological:      General: No focal deficit present.      Mental Status: He is alert. Mental status is at baseline.   Psychiatric:         Mood and Affect: Mood normal.         Behavior: Behavior normal.          Medical Decision Making & ED Course   Medical Decision Makin y.o. male past medical history of epilepsy currently on the Lamictal Keppra and Depakote who presents today for seizure.  Given the fact the patient is unsure whether or not he has been taking his seizure medications we will get basic labs to confirm the patient does not have any other cause for seizure.  We will also get levels of his Depakote lamotrigine and Keppra.  Additionally, given the unexplained blood on his face and what could potentially be postictal confusion, we will get a CT of his head to confirm there is no evidence of intracranial abnormality that would have caused this to happen. Patient's labs did demonstrate a subtherapeutic Keppra level, so we will load him here. His lamotrigine level was in fact normal, which is reassuring. His labs were otherwise unremarkable, CT head was negative for any evidence of acute intracranial abnormality.  Given this, we will discharge him home at this time.  All questions were answered prior to discharge, return precautions provided  ----      Differential diagnoses considered include but are not limited to: Hyponatremia, hypomagnesemia, hypokalemia, breakthrough seizure, medication nonadherence     Social  Determinants of Health which Significantly Impact Care: None identified     EKG Independent Interpretation: EKG not obtained    Independent Result Review and Interpretation: Relevant laboratory and radiographic results were reviewed and independently interpreted by myself.  As necessary, they are commented on in the ED Course.    Chronic conditions affecting the patient's care: As documented above in [facility]    The patient was discussed with the following consultants/services: None    Care Considerations: As documented above in [facility]    ED Course:  Diagnoses as of 09/09/24 1608   Seizure (Multi)     Disposition   As a result of the work-up, the patient was discharged home.  he was informed of his diagnosis and instructed to come back with any concerns or worsening of condition.  he and was agreeable to the plan as discussed above.  he was given the opportunity to ask questions.  All of the patient's questions were answered.    Procedures   Procedures    Patient seen and discussed with ED attending physician.    Kodi Mcghee MD  Emergency Medicine       Kodi Mcghee MD  Resident  09/09/24 6339       Jacob Mccabe MD  09/10/24 0924

## 2024-09-10 ENCOUNTER — HOSPITAL ENCOUNTER (INPATIENT)
Facility: HOSPITAL | Age: 29
LOS: 3 days | Discharge: HOME | End: 2024-09-13
Attending: EMERGENCY MEDICINE | Admitting: STUDENT IN AN ORGANIZED HEALTH CARE EDUCATION/TRAINING PROGRAM
Payer: COMMERCIAL

## 2024-09-10 ENCOUNTER — CLINICAL SUPPORT (OUTPATIENT)
Dept: EMERGENCY MEDICINE | Facility: HOSPITAL | Age: 29
End: 2024-09-10
Payer: COMMERCIAL

## 2024-09-10 DIAGNOSIS — F99 PSYCHIATRIC PROBLEM: Primary | ICD-10-CM

## 2024-09-10 DIAGNOSIS — F99 PSYCHIATRIC COMPLAINT: ICD-10-CM

## 2024-09-10 DIAGNOSIS — R05.1 ACUTE COUGH: ICD-10-CM

## 2024-09-10 DIAGNOSIS — R74.8 ELEVATED CREATINE KINASE: ICD-10-CM

## 2024-09-10 LAB
ALBUMIN SERPL BCP-MCNC: 4.5 G/DL (ref 3.4–5)
ALP SERPL-CCNC: 37 U/L (ref 33–120)
ALT SERPL W P-5'-P-CCNC: 15 U/L (ref 10–52)
AMPHETAMINES UR QL SCN: NORMAL
ANION GAP SERPL CALC-SCNC: 15 MMOL/L (ref 10–20)
APAP SERPL-MCNC: <10 UG/ML
AST SERPL W P-5'-P-CCNC: 26 U/L (ref 9–39)
ATRIAL RATE: 87 BPM
BARBITURATES UR QL SCN: NORMAL
BASOPHILS # BLD AUTO: 0.03 X10*3/UL (ref 0–0.1)
BASOPHILS NFR BLD AUTO: 0.7 %
BENZODIAZ UR QL SCN: NORMAL
BILIRUB SERPL-MCNC: 0.3 MG/DL (ref 0–1.2)
BUN SERPL-MCNC: 14 MG/DL (ref 6–23)
BZE UR QL SCN: NORMAL
CALCIUM SERPL-MCNC: 8.9 MG/DL (ref 8.6–10.6)
CANNABINOIDS UR QL SCN: NORMAL
CHLORIDE SERPL-SCNC: 104 MMOL/L (ref 98–107)
CK SERPL-CCNC: 1630 U/L (ref 0–325)
CK SERPL-CCNC: 2311 U/L (ref 0–325)
CK SERPL-CCNC: 2392 U/L (ref 0–325)
CO2 SERPL-SCNC: 25 MMOL/L (ref 21–32)
CREAT SERPL-MCNC: 0.86 MG/DL (ref 0.5–1.3)
EGFRCR SERPLBLD CKD-EPI 2021: >90 ML/MIN/1.73M*2
EOSINOPHIL # BLD AUTO: 0.04 X10*3/UL (ref 0–0.7)
EOSINOPHIL NFR BLD AUTO: 0.9 %
ERYTHROCYTE [DISTWIDTH] IN BLOOD BY AUTOMATED COUNT: 20.2 % (ref 11.5–14.5)
ETHANOL SERPL-MCNC: <10 MG/DL
FENTANYL+NORFENTANYL UR QL SCN: NORMAL
GLUCOSE SERPL-MCNC: 92 MG/DL (ref 74–99)
HCT VFR BLD AUTO: 33.9 % (ref 41–52)
HGB BLD-MCNC: 11.2 G/DL (ref 13.5–17.5)
IMM GRANULOCYTES # BLD AUTO: 0.02 X10*3/UL (ref 0–0.7)
IMM GRANULOCYTES NFR BLD AUTO: 0.5 % (ref 0–0.9)
LEVETIRACETAM SERPL-MCNC: 12 UG/ML (ref 10–40)
LYMPHOCYTES # BLD AUTO: 1.02 X10*3/UL (ref 1.2–4.8)
LYMPHOCYTES NFR BLD AUTO: 23.4 %
MCH RBC QN AUTO: 24.6 PG (ref 26–34)
MCHC RBC AUTO-ENTMCNC: 33 G/DL (ref 32–36)
MCV RBC AUTO: 75 FL (ref 80–100)
METHADONE UR QL SCN: NORMAL
MONOCYTES # BLD AUTO: 0.69 X10*3/UL (ref 0.1–1)
MONOCYTES NFR BLD AUTO: 15.9 %
NEUTROPHILS # BLD AUTO: 2.55 X10*3/UL (ref 1.2–7.7)
NEUTROPHILS NFR BLD AUTO: 58.6 %
NRBC BLD-RTO: 0 /100 WBCS (ref 0–0)
OPIATES UR QL SCN: NORMAL
OXYCODONE+OXYMORPHONE UR QL SCN: NORMAL
P AXIS: 50 DEGREES
P OFFSET: 181 MS
P ONSET: 123 MS
PCP UR QL SCN: NORMAL
PLATELET # BLD AUTO: 194 X10*3/UL (ref 150–450)
POTASSIUM SERPL-SCNC: 3.6 MMOL/L (ref 3.5–5.3)
PR INTERVAL: 198 MS
PROT SERPL-MCNC: 6.7 G/DL (ref 6.4–8.2)
Q ONSET: 222 MS
QRS COUNT: 15 BEATS
QRS DURATION: 86 MS
QT INTERVAL: 328 MS
QTC CALCULATION(BAZETT): 394 MS
QTC FREDERICIA: 371 MS
R AXIS: 97 DEGREES
RBC # BLD AUTO: 4.55 X10*6/UL (ref 4.5–5.9)
RBC MORPH BLD: NORMAL
SALICYLATES SERPL-MCNC: <3 MG/DL
SODIUM SERPL-SCNC: 140 MMOL/L (ref 136–145)
T AXIS: 12 DEGREES
T OFFSET: 386 MS
VALPROATE SERPL-MCNC: 45 UG/ML (ref 50–100)
VENTRICULAR RATE: 87 BPM
WBC # BLD AUTO: 4.4 X10*3/UL (ref 4.4–11.3)

## 2024-09-10 PROCEDURE — 80307 DRUG TEST PRSMV CHEM ANLYZR: CPT

## 2024-09-10 PROCEDURE — 85025 COMPLETE CBC W/AUTO DIFF WBC: CPT

## 2024-09-10 PROCEDURE — 36415 COLL VENOUS BLD VENIPUNCTURE: CPT

## 2024-09-10 PROCEDURE — 2500000004 HC RX 250 GENERAL PHARMACY W/ HCPCS (ALT 636 FOR OP/ED): Mod: SE | Performed by: EMERGENCY MEDICINE

## 2024-09-10 PROCEDURE — 99285 EMERGENCY DEPT VISIT HI MDM: CPT

## 2024-09-10 PROCEDURE — 80143 DRUG ASSAY ACETAMINOPHEN: CPT

## 2024-09-10 PROCEDURE — 99285 EMERGENCY DEPT VISIT HI MDM: CPT | Performed by: EMERGENCY MEDICINE

## 2024-09-10 PROCEDURE — 96361 HYDRATE IV INFUSION ADD-ON: CPT

## 2024-09-10 PROCEDURE — 82550 ASSAY OF CK (CPK): CPT

## 2024-09-10 PROCEDURE — 80320 DRUG SCREEN QUANTALCOHOLS: CPT

## 2024-09-10 PROCEDURE — 93005 ELECTROCARDIOGRAM TRACING: CPT

## 2024-09-10 PROCEDURE — 2500000001 HC RX 250 WO HCPCS SELF ADMINISTERED DRUGS (ALT 637 FOR MEDICARE OP): Mod: SE

## 2024-09-10 PROCEDURE — 99222 1ST HOSP IP/OBS MODERATE 55: CPT | Performed by: STUDENT IN AN ORGANIZED HEALTH CARE EDUCATION/TRAINING PROGRAM

## 2024-09-10 PROCEDURE — 96360 HYDRATION IV INFUSION INIT: CPT | Mod: 59

## 2024-09-10 PROCEDURE — 80164 ASSAY DIPROPYLACETIC ACD TOT: CPT

## 2024-09-10 PROCEDURE — 80177 DRUG SCRN QUAN LEVETIRACETAM: CPT

## 2024-09-10 PROCEDURE — 96372 THER/PROPH/DIAG INJ SC/IM: CPT | Performed by: EMERGENCY MEDICINE

## 2024-09-10 PROCEDURE — 80053 COMPREHEN METABOLIC PANEL: CPT

## 2024-09-10 PROCEDURE — 2500000004 HC RX 250 GENERAL PHARMACY W/ HCPCS (ALT 636 FOR OP/ED): Mod: SE

## 2024-09-10 PROCEDURE — 93010 ELECTROCARDIOGRAM REPORT: CPT | Performed by: EMERGENCY MEDICINE

## 2024-09-10 PROCEDURE — 1210000001 HC SEMI-PRIVATE ROOM DAILY

## 2024-09-10 PROCEDURE — 84075 ASSAY ALKALINE PHOSPHATASE: CPT

## 2024-09-10 RX ORDER — LAMOTRIGINE 200 MG/1
200 TABLET ORAL 2 TIMES DAILY
COMMUNITY
End: 2024-09-11 | Stop reason: ALTCHOICE

## 2024-09-10 RX ORDER — MIDAZOLAM HYDROCHLORIDE 1 MG/ML
5 INJECTION INTRAMUSCULAR; INTRAVENOUS ONCE AS NEEDED
Status: DISCONTINUED | OUTPATIENT
Start: 2024-09-10 | End: 2024-09-13 | Stop reason: HOSPADM

## 2024-09-10 RX ORDER — ARIPIPRAZOLE 10 MG/1
10 TABLET ORAL
Status: DISCONTINUED | OUTPATIENT
Start: 2024-09-10 | End: 2024-09-12

## 2024-09-10 RX ORDER — ESCITALOPRAM OXALATE 20 MG/1
20 TABLET ORAL DAILY
Status: DISCONTINUED | OUTPATIENT
Start: 2024-09-10 | End: 2024-09-13 | Stop reason: HOSPADM

## 2024-09-10 RX ORDER — DIVALPROEX SODIUM 250 MG/1
750 TABLET, DELAYED RELEASE ORAL NIGHTLY
COMMUNITY

## 2024-09-10 RX ORDER — HALOPERIDOL 5 MG/ML
INJECTION INTRAMUSCULAR
Status: COMPLETED
Start: 2024-09-10 | End: 2024-09-10

## 2024-09-10 RX ORDER — ESCITALOPRAM OXALATE 20 MG/1
20 TABLET ORAL DAILY
COMMUNITY

## 2024-09-10 RX ORDER — HALOPERIDOL 5 MG/ML
5 INJECTION INTRAMUSCULAR ONCE AS NEEDED
Status: COMPLETED | OUTPATIENT
Start: 2024-09-10 | End: 2024-09-10

## 2024-09-10 RX ORDER — MIDAZOLAM HYDROCHLORIDE 5 MG/ML
INJECTION, SOLUTION INTRAMUSCULAR; INTRAVENOUS
Status: COMPLETED
Start: 2024-09-10 | End: 2024-09-10

## 2024-09-10 RX ORDER — CHOLECALCIFEROL (VITAMIN D3) 50 MCG
50 TABLET ORAL DAILY
COMMUNITY

## 2024-09-10 RX ORDER — HALOPERIDOL 5 MG/1
5 TABLET ORAL ONCE AS NEEDED
Status: COMPLETED | OUTPATIENT
Start: 2024-09-10 | End: 2024-09-10

## 2024-09-10 RX ORDER — LAMOTRIGINE 200 MG/1
200 TABLET ORAL 2 TIMES DAILY
Status: DISCONTINUED | OUTPATIENT
Start: 2024-09-10 | End: 2024-09-13 | Stop reason: HOSPADM

## 2024-09-10 RX ORDER — SODIUM CHLORIDE, SODIUM LACTATE, POTASSIUM CHLORIDE, CALCIUM CHLORIDE 600; 310; 30; 20 MG/100ML; MG/100ML; MG/100ML; MG/100ML
125 INJECTION, SOLUTION INTRAVENOUS CONTINUOUS
Status: DISCONTINUED | OUTPATIENT
Start: 2024-09-10 | End: 2024-09-10

## 2024-09-10 RX ORDER — ARIPIPRAZOLE 10 MG/1
10 TABLET ORAL 2 TIMES DAILY
Status: ON HOLD | COMMUNITY
End: 2024-09-13

## 2024-09-10 RX ADMIN — ESCITALOPRAM OXALATE 20 MG: 20 TABLET ORAL at 18:31

## 2024-09-10 RX ADMIN — HALOPERIDOL LACTATE 5 MG: 5 INJECTION, SOLUTION INTRAMUSCULAR at 08:22

## 2024-09-10 RX ADMIN — ARIPIPRAZOLE 10 MG: 5 TABLET ORAL at 18:31

## 2024-09-10 RX ADMIN — SODIUM CHLORIDE, POTASSIUM CHLORIDE, SODIUM LACTATE AND CALCIUM CHLORIDE 1000 ML: 600; 310; 30; 20 INJECTION, SOLUTION INTRAVENOUS at 21:34

## 2024-09-10 RX ADMIN — SODIUM CHLORIDE, POTASSIUM CHLORIDE, SODIUM LACTATE AND CALCIUM CHLORIDE 1000 ML: 600; 310; 30; 20 INJECTION, SOLUTION INTRAVENOUS at 17:49

## 2024-09-10 RX ADMIN — LAMOTRIGINE 200 MG: 100 TABLET ORAL at 20:09

## 2024-09-10 RX ADMIN — DIVALPROEX SODIUM 750 MG: 500 TABLET, DELAYED RELEASE ORAL at 20:09

## 2024-09-10 RX ADMIN — MIDAZOLAM HYDROCHLORIDE 5 MG: 5 INJECTION, SOLUTION INTRAMUSCULAR; INTRAVENOUS at 08:23

## 2024-09-10 RX ADMIN — SODIUM CHLORIDE, POTASSIUM CHLORIDE, SODIUM LACTATE AND CALCIUM CHLORIDE 125 ML/HR: 600; 310; 30; 20 INJECTION, SOLUTION INTRAVENOUS at 21:18

## 2024-09-10 SDOH — HEALTH STABILITY: MENTAL HEALTH: IN THE PAST FEW WEEKS, HAVE YOU WISHED YOU WERE DEAD?: NO

## 2024-09-10 SDOH — HEALTH STABILITY: MENTAL HEALTH: BEHAVIORS/MOOD: AGITATED;IMPULSIVE;COMBATIVE

## 2024-09-10 SDOH — HEALTH STABILITY: MENTAL HEALTH: WISH TO BE DEAD (PAST 1 MONTH): NO

## 2024-09-10 SDOH — HEALTH STABILITY: MENTAL HEALTH: DEPRESSION SYMPTOMS: NO PROBLEMS REPORTED OR OBSERVED.

## 2024-09-10 SDOH — ECONOMIC STABILITY: HOUSING INSECURITY: FEELS SAFE LIVING IN HOME: YES

## 2024-09-10 SDOH — HEALTH STABILITY: MENTAL HEALTH: HAVE YOU EVER TRIED TO KILL YOURSELF?: NO

## 2024-09-10 SDOH — HEALTH STABILITY: MENTAL HEALTH: ANXIETY SYMPTOMS: NO PROBLEMS REPORTED OR OBSERVED.

## 2024-09-10 SDOH — HEALTH STABILITY: MENTAL HEALTH: IN THE PAST FEW WEEKS, HAVE YOU FELT THAT YOU OR YOUR FAMILY WOULD BE BETTER OFF IF YOU WERE DEAD?: NO

## 2024-09-10 SDOH — HEALTH STABILITY: MENTAL HEALTH: IN THE PAST WEEK, HAVE YOU BEEN HAVING THOUGHTS ABOUT KILLING YOURSELF?: NO

## 2024-09-10 SDOH — HEALTH STABILITY: MENTAL HEALTH: BEHAVIORAL HEALTH(WDL): EXCEPTIONS TO WDL

## 2024-09-10 SDOH — HEALTH STABILITY: MENTAL HEALTH: SUICIDAL BEHAVIOR (LIFETIME): NO

## 2024-09-10 SDOH — HEALTH STABILITY: MENTAL HEALTH: ARE YOU HAVING THOUGHTS OF KILLING YOURSELF RIGHT NOW?: NO

## 2024-09-10 SDOH — HEALTH STABILITY: MENTAL HEALTH: NON-SPECIFIC ACTIVE SUICIDAL THOUGHTS (PAST 1 MONTH): NO

## 2024-09-10 ASSESSMENT — LIFESTYLE VARIABLES
PRESCIPTION_ABUSE_PAST_12_MONTHS: NO
SUBSTANCE_ABUSE_PAST_12_MONTHS: NO

## 2024-09-10 ASSESSMENT — COLUMBIA-SUICIDE SEVERITY RATING SCALE - C-SSRS
1. IN THE PAST MONTH, HAVE YOU WISHED YOU WERE DEAD OR WISHED YOU COULD GO TO SLEEP AND NOT WAKE UP?: NO
6. HAVE YOU EVER DONE ANYTHING, STARTED TO DO ANYTHING, OR PREPARED TO DO ANYTHING TO END YOUR LIFE?: NO
2. HAVE YOU ACTUALLY HAD ANY THOUGHTS OF KILLING YOURSELF?: NO

## 2024-09-10 ASSESSMENT — PAIN SCALES - GENERAL: PAINLEVEL_OUTOF10: 0 - NO PAIN

## 2024-09-10 ASSESSMENT — PAIN - FUNCTIONAL ASSESSMENT: PAIN_FUNCTIONAL_ASSESSMENT: 0-10
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
## 2024-09-10 NOTE — PROGRESS NOTES
Behavioral Restraint / Seclusion Face to Face Assessment    Patient Name:         Dee Dee Mancuso  YOB: 1995  Medical Record #:   [id number]      Time Restraints were placed:      Date Assessment was completed: 9/10/2024    Time patient was assessed: 8:19 AM     Description of behavior causing restraint/seclusion: verbalizing threats to self or others and combative and striking out at staff or others    Type of intervention: Mechanical restraint and Physical restraint (holding)    Patient's immediate situation: alert and oriented and no signs of physical distress    Alternatives Attempted: Alternatives attempted and have been ineffective.    Contraindications for Restraints: Reviewed contraindications for continued restraint use and agree to on-going need.    Patent's reaction to intervention: appears safe, appears comfortable, appears to be tolerating restraint/seclusion without distress or adverse response, continues to be assaultive, and continues to be combative    Patient's medical condition: vital signs stable, normal circulation and breathing, and positioned safely based upon medical and psychological issues    Patient's behavioral condition: continues to display agitated, threatening, or violent behavior to self and continues to display agitated, threatening, or violent behavior to others    Plan: Discontinue restraints when patient meets criteria

## 2024-09-10 NOTE — PROGRESS NOTES
"Observation History and Physical  [facility] EMERGENCY MEDICINE           History of Present Illness     History provided by: EMS  Limitations to History: Mental Illness  External Records Reviewed: Reviewed in original ED Provider Note      Patient History:  Dee Dee Mancuso is a 29 y.o. male who presented to the emergency department for psychiatric evaluation, concern for internal stimulation and decompensated psychiatric illness.    Dee Dee Mancuso was escalated to observation status. Observation was necessary as they continue to require treatment and monitoring of their psychiatric illness while awaiting inpatient behavioral health bed availability.    Physical Exam     Visit Vitals  /83 (BP Location: Right arm, Patient Position: Lying)   Pulse 86   Temp 36 °C (96.8 °F) (Temporal)   Resp 16   Ht 1.651 m (5' 5\")   Wt 79.4 kg (175 lb)   SpO2 99%   BMI 29.12 kg/m²   Smoking Status Unknown   BSA 1.91 m²       GENERAL:  The patient appears nourished and normally developed. Vital signs as documented.     PULMONARY:  Without any respiratory distress. Able to speak full sentences, no accessory muscle use    CARDIAC: Warm and well perfused. No cyanosis.    MUSCULOSKELETAL:   Able to ambulate, Non edematous, with no obvious deformities.     SKIN: No pallor. Intact.    NEURO:  No obvious neurological deficits.  Able to follow commands.    Psych: internally stimulated, unable to assess SI, HI, AVH      Impression and Plan     Dee Dee Mancuso under observation status in [facility] EMERGENCY MEDICINE for psychiatric illness monitoring and treatment while awaiting inpatient behavioral health bed availability.   Emergency Psychiatric Assessment Team has been consulted. Case discussed with them and decision for inpatient hospitalization deemed necessary. Patient is medically clear at this time. Home medications reviewed and restarted where clinically indicated. Patient and Family updated on " plan of care.     Guillermina Gonzales,

## 2024-09-10 NOTE — PROGRESS NOTES
EPAT - Social Work Psychiatric Assessment    Arrival Details  Mode of Arrival: Ambulance  Admission Source: Home  Admission Type: Voluntary  EPAT Assessment Start Date: 09/10/24  EPAT Assessment Start Time: 1230  Name of : Jackie Patel LPC    History of Present Illness  Admission Reason: psychiatric evaluation  HPI: Pt is 29 years old AA male who presented to the ED for a psychiatric evaluation. Pt has a history of ID and depression. Pt has no known history of inpatient psychiatric admission. Pt is seeing MH provider and a therapist at [facility]. Pt is compliant with medications. Pt’s chart, ED provider, and nursing notes were reviewed prior to the assessment. EMS services were called by pt’s  Bill, “Per police, they were called to the patient's group home because the  stated he was violent and agitated. Police found the  restraining the patient on the ground. During my evaluation, patient states he has a history of seizures and he does not know why he is in the ED. He started mentioning something about a stranger and seemed internally stimulated when he grabbed my hand.” Pt’s BAL and UDS were negative.    SW Readmission Information   Readmission within 30 Days: Yes  Previous ED Visit Date and Reason : 09/08/24 Seizure  Previous Discharge Date and Location: 09/08/24 [facility]    Psychiatric Symptoms  Anxiety Symptoms: No problems reported or observed.  Depression Symptoms: No problems reported or observed.  Jennifer Symptoms: No problems reported or observed.    Psychosis Symptoms  Hallucination Type: No problems reported or observed.  Delusion Type: No problems reported or observed.    Additional Symptoms - Adult  Generalized Anxiety Disorder: No problems reported or observed.  Obsessive Compulsive Disorder: No problems reported or observed.  Panic Attack: No problems reported or observed.  Post Traumatic Stress Disorder: No problems reported or  observed.  Delirium: Disorientation    Past Psychiatric History/Meds/Treatments  Past Psychiatric History: ID, depression  Past Psychiatric Meds/Treatments: no known hx of inpatient admissions    Current Mental Health Contacts   Name/Phone Number: [facility]  Provider Name/Phone Number: [facility]    Support System: Other (Comment) (, landlord Bill and friends in the house)    Living Arrangement: Other (Comment) (group home)    Home Safety  Feels Safe Living in Home: Yes    Income Information  Employment Status for: Patient  Employment Status: Disabled  Income Source: Disability    [employer] Service/Education History  Current or Previous  Service: None         Legal  Legal Considerations: Patient/ Family Capacity to Make Sound Judgments  Criminal Activity/ Legal Involvement Pertinent to Current Situation/ Hospitalization: none    Drug Screening  Have you used any substances (canabis, cocaine, heroin, hallucinogens, inhalants, etc.) in the past 12 months?: No  Have you used any prescription drugs other than prescribed in the past 12 months?: No  Is a toxicology screen needed?: Yes         Psychosocial  Psychosocial (WDL): Within Defined Limits  Behaviors/Mood: Appropriate for age, Appropriate for situation    Orientation  Orientation Level: Disoriented to situation    General Appearance  Motor Activity: Unremarkable  Speech Pattern: Excessively soft  General Attitude: Cooperative  Appearance/Hygiene: Unremarkable    Thought Process  Content: Unremarkable  Delusions: Other (Comment) (none)  Perception: Not altered  Hallucination: None  Judgment/Insight: Poor  Confusion: None  Cognition: Appropriate safety awareness, Appropriate attention/concentration, Follows commands, Cognitive delay    Sleep Pattern  Sleep Pattern: Sleeps all night    Risk Factors  Self Harm/Suicidal Ideation Plan: denied  Previous Self Harm/Suicidal Plans: denied  Risk Factors: Age < 19 years old, Lower  IQ, Lower socioeconomic status, Major mental illness, Male    Violence Risk Assessment  Assessment of Violence: On admission  Thoughts of Harm to Others: No    Ability to Assess Risk Screen  Risk Screen - Ability to Assess: Able to be screened  Ask Suicide-Screening Questions  1. In the past few weeks, have you wished you were dead?: No  2. In the past few weeks, have you felt that you or your family would be better off if you were dead?: No  3. In the past week, have you been having thoughts about killing yourself?: No  4. Have you ever tried to kill yourself?: No  5. Are you having thoughts of killing yourself right now?: No  Calculated Risk Score: No intervention is necessary  [place] Suicide Severity Rating Scale (Screener/Recent Self-Report)  1. Wish to be Dead (Past 1 Month): No  2. Non-Specific Active Suicidal Thoughts (Past 1 Month): No  6. Suicidal Behavior (Lifetime): No  Calculated C-SSRS Risk Score (Lifetime/Recent): No Risk Indicated  Step 1: Risk Factors  Current & Past Psychiatric Dx: Mood disorder, Recent onset  Presenting Symptoms: Impulsivity, Psychosis  Precipitants/Stressors:  (denied)  Change in Treatment: Other (Comment) (denied)  Access to Lethal Methods : No  Step 2: Protective Factors   Protective Factors Internal: Ability to cope with stress, Frustration tolerance, Orthodox beliefs  Protective Factors External: Cultural, spiritual and/or moral attitudes against suicide, Supportive social network or family or friends, Positive therapeutic relationships  Step 5: Documentation  Risk Level: Low suicide risk    Psychiatric Impression and Plan of Care  Assessment and Plan: Pt is 29 years old AA female with a history of ID and depression, was brought to the ED for a psychiatric evaluation. During the assessment, pt was lying in bed, dressed in hospital attire. Pt was calm and cooperative. Pt stated that he is here because of “seizure.” When asked about reports of him being “violent and  "agitated,\" Pt stated that “someone was moving” and he was just “nervous.” Pt stated that his current stressor is “seizures.” Pt is low-risk on the [place] SSRS. Pt is denying SI. Pt denied history of SA. Pt was able to identify a support system: the , fernandolord Troy, and friends in the house. Pt was able to identify protective factors: ability to cope with stress and frustration tolerance, fear of dying, spiritual beliefs, and positive therapeutic relationship. Pt was future-oriented: “looking forward to meetings with the .” Pt denied access to firearms. Pt denied HI, AH/VH. Pt felt safe to be discharged home.       Pt’s group home was contacted for further collateral information at [phone]. This writer spoke to Troy; he stated that when he came to the group home at 7.30am this morning, pt was sitting on a stairs “absolutely naked.” In addition, pt was disoriented and not able to answer any questions “He was saying random words. He didn’t make sense.” Troy has known the pt since April 2022 and has never seen him like that. He stated that \"it felt like he was under something.” Troy thought that it would be beneficial for the pt to be admitted for further evaluation. Troy denied hx of SI/SA. I addition, he stated that pt was agitated.         DX: unspecified psychosis         At this time, pt meets the criteria for inpatient psychiatric admission for further evaluation, stability, treatment, and safety. Reviewed with Dr. Mathur, who is in agreement.  Specific Resources Provided to Patient: inpatient admission  CM Notified: no  PHP/IOP Recommended: none    Outcome/Disposition  Patient's Perception of Outcome Achieved: unknown  Assessment, Recommendations and Risk Level Reviewed with: Dr. Mathur  Contact Name: Troy  Contact Number(s): [phone]  Contact Relationship:   EPAT Assessment Completed Date: 09/10/24  EPAT Assessment Completed Time: 1300    Social Work Note  "

## 2024-09-11 LAB
CK SERPL-CCNC: 2548 U/L (ref 0–325)
HOLD SPECIMEN: NORMAL
HOLD SPECIMEN: NORMAL
SCAN RESULT: ABNORMAL
VALPROATE FREE SERPL-MCNC: <1.3 UG/ML (ref 4–30)

## 2024-09-11 PROCEDURE — 1100000001 HC PRIVATE ROOM DAILY

## 2024-09-11 PROCEDURE — 36415 COLL VENOUS BLD VENIPUNCTURE: CPT

## 2024-09-11 PROCEDURE — 99255 IP/OBS CONSLTJ NEW/EST HI 80: CPT | Performed by: PSYCHIATRY & NEUROLOGY

## 2024-09-11 PROCEDURE — 2500000001 HC RX 250 WO HCPCS SELF ADMINISTERED DRUGS (ALT 637 FOR MEDICARE OP): Performed by: STUDENT IN AN ORGANIZED HEALTH CARE EDUCATION/TRAINING PROGRAM

## 2024-09-11 PROCEDURE — 99232 SBSQ HOSP IP/OBS MODERATE 35: CPT | Performed by: STUDENT IN AN ORGANIZED HEALTH CARE EDUCATION/TRAINING PROGRAM

## 2024-09-11 PROCEDURE — 82550 ASSAY OF CK (CPK): CPT

## 2024-09-11 PROCEDURE — 2500000004 HC RX 250 GENERAL PHARMACY W/ HCPCS (ALT 636 FOR OP/ED): Performed by: STUDENT IN AN ORGANIZED HEALTH CARE EDUCATION/TRAINING PROGRAM

## 2024-09-11 RX ORDER — POLYETHYLENE GLYCOL 3350 17 G/17G
17 POWDER, FOR SOLUTION ORAL DAILY
Status: DISCONTINUED | OUTPATIENT
Start: 2024-09-11 | End: 2024-09-13 | Stop reason: HOSPADM

## 2024-09-11 RX ORDER — SODIUM CHLORIDE, SODIUM LACTATE, POTASSIUM CHLORIDE, CALCIUM CHLORIDE 600; 310; 30; 20 MG/100ML; MG/100ML; MG/100ML; MG/100ML
200 INJECTION, SOLUTION INTRAVENOUS CONTINUOUS
Status: ACTIVE | OUTPATIENT
Start: 2024-09-11 | End: 2024-09-12

## 2024-09-11 RX ORDER — SODIUM CHLORIDE, SODIUM LACTATE, POTASSIUM CHLORIDE, CALCIUM CHLORIDE 600; 310; 30; 20 MG/100ML; MG/100ML; MG/100ML; MG/100ML
200 INJECTION, SOLUTION INTRAVENOUS CONTINUOUS
Status: DISCONTINUED | OUTPATIENT
Start: 2024-09-11 | End: 2024-09-11

## 2024-09-11 RX ORDER — LEVETIRACETAM 500 MG/1
500 TABLET ORAL 2 TIMES DAILY
Status: DISCONTINUED | OUTPATIENT
Start: 2024-09-11 | End: 2024-09-13 | Stop reason: HOSPADM

## 2024-09-11 RX ORDER — SODIUM CHLORIDE, SODIUM LACTATE, POTASSIUM CHLORIDE, CALCIUM CHLORIDE 600; 310; 30; 20 MG/100ML; MG/100ML; MG/100ML; MG/100ML
200 INJECTION, SOLUTION INTRAVENOUS CONTINUOUS
Status: DISCONTINUED | OUTPATIENT
Start: 2024-09-11 | End: 2024-09-13 | Stop reason: HOSPADM

## 2024-09-11 RX ADMIN — SODIUM CHLORIDE, POTASSIUM CHLORIDE, SODIUM LACTATE AND CALCIUM CHLORIDE 200 ML/HR: 600; 310; 30; 20 INJECTION, SOLUTION INTRAVENOUS at 15:53

## 2024-09-11 RX ADMIN — SODIUM CHLORIDE, POTASSIUM CHLORIDE, SODIUM LACTATE AND CALCIUM CHLORIDE 200 ML/HR: 600; 310; 30; 20 INJECTION, SOLUTION INTRAVENOUS at 21:14

## 2024-09-11 RX ADMIN — ARIPIPRAZOLE 10 MG: 5 TABLET ORAL at 18:00

## 2024-09-11 RX ADMIN — ESCITALOPRAM OXALATE 20 MG: 20 TABLET ORAL at 15:59

## 2024-09-11 SDOH — SOCIAL STABILITY: SOCIAL INSECURITY: DOES ANYONE TRY TO KEEP YOU FROM HAVING/CONTACTING OTHER FRIENDS OR DOING THINGS OUTSIDE YOUR HOME?: NO

## 2024-09-11 SDOH — SOCIAL STABILITY: SOCIAL INSECURITY: ARE YOU OR HAVE YOU BEEN THREATENED OR ABUSED PHYSICALLY, EMOTIONALLY, OR SEXUALLY BY ANYONE?: NO

## 2024-09-11 SDOH — SOCIAL STABILITY: SOCIAL INSECURITY: HAVE YOU HAD ANY THOUGHTS OF HARMING ANYONE ELSE?: NO

## 2024-09-11 SDOH — SOCIAL STABILITY: SOCIAL INSECURITY: ARE THERE ANY APPARENT SIGNS OF INJURIES/BEHAVIORS THAT COULD BE RELATED TO ABUSE/NEGLECT?: NO

## 2024-09-11 SDOH — SOCIAL STABILITY: SOCIAL INSECURITY: WERE YOU ABLE TO COMPLETE ALL THE BEHAVIORAL HEALTH SCREENINGS?: YES

## 2024-09-11 SDOH — SOCIAL STABILITY: SOCIAL INSECURITY: HAS ANYONE EVER THREATENED TO HURT YOUR FAMILY OR YOUR PETS?: NO

## 2024-09-11 SDOH — SOCIAL STABILITY: SOCIAL INSECURITY: HAVE YOU HAD THOUGHTS OF HARMING ANYONE ELSE?: NO

## 2024-09-11 SDOH — SOCIAL STABILITY: SOCIAL INSECURITY: ABUSE: ADULT

## 2024-09-11 SDOH — SOCIAL STABILITY: SOCIAL INSECURITY: DO YOU FEEL ANYONE HAS EXPLOITED OR TAKEN ADVANTAGE OF YOU FINANCIALLY OR OF YOUR PERSONAL PROPERTY?: NO

## 2024-09-11 SDOH — SOCIAL STABILITY: SOCIAL INSECURITY: DO YOU FEEL UNSAFE GOING BACK TO THE PLACE WHERE YOU ARE LIVING?: NO

## 2024-09-11 ASSESSMENT — LIFESTYLE VARIABLES
SKIP TO QUESTIONS 9-10: 0
HOW MANY STANDARD DRINKS CONTAINING ALCOHOL DO YOU HAVE ON A TYPICAL DAY: PATIENT DECLINED
AUDIT-C TOTAL SCORE: -1
AUDIT-C TOTAL SCORE: -1
PRESCIPTION_ABUSE_PAST_12_MONTHS: NO
HOW OFTEN DO YOU HAVE A DRINK CONTAINING ALCOHOL: PATIENT DECLINED
SUBSTANCE_ABUSE_PAST_12_MONTHS: NO
HOW OFTEN DO YOU HAVE 6 OR MORE DRINKS ON ONE OCCASION: PATIENT DECLINED

## 2024-09-11 ASSESSMENT — COGNITIVE AND FUNCTIONAL STATUS - GENERAL
MOBILITY SCORE: 24
DAILY ACTIVITIY SCORE: 24
PATIENT BASELINE BEDBOUND: NO

## 2024-09-11 ASSESSMENT — PATIENT HEALTH QUESTIONNAIRE - PHQ9
1. LITTLE INTEREST OR PLEASURE IN DOING THINGS: NOT AT ALL
SUM OF ALL RESPONSES TO PHQ9 QUESTIONS 1 & 2: 0
2. FEELING DOWN, DEPRESSED OR HOPELESS: NOT AT ALL

## 2024-09-11 ASSESSMENT — ACTIVITIES OF DAILY LIVING (ADL)
DRESSING YOURSELF: INDEPENDENT
ADEQUATE_TO_COMPLETE_ADL: YES
GROOMING: INDEPENDENT
LACK_OF_TRANSPORTATION: NO
JUDGMENT_ADEQUATE_SAFELY_COMPLETE_DAILY_ACTIVITIES: YES
TOILETING: INDEPENDENT
PATIENT'S MEMORY ADEQUATE TO SAFELY COMPLETE DAILY ACTIVITIES?: YES
BATHING: INDEPENDENT
HEARING - LEFT EAR: FUNCTIONAL
HEARING - RIGHT EAR: FUNCTIONAL
FEEDING YOURSELF: INDEPENDENT
WALKS IN HOME: INDEPENDENT

## 2024-09-11 ASSESSMENT — COLUMBIA-SUICIDE SEVERITY RATING SCALE - C-SSRS
1. SINCE LAST CONTACT, HAVE YOU WISHED YOU WERE DEAD OR WISHED YOU COULD GO TO SLEEP AND NOT WAKE UP?: NO
6. HAVE YOU EVER DONE ANYTHING, STARTED TO DO ANYTHING, OR PREPARED TO DO ANYTHING TO END YOUR LIFE?: NO
1. SINCE LAST CONTACT, HAVE YOU WISHED YOU WERE DEAD OR WISHED YOU COULD GO TO SLEEP AND NOT WAKE UP?: NO
2. HAVE YOU ACTUALLY HAD ANY THOUGHTS OF KILLING YOURSELF?: NO
2. HAVE YOU ACTUALLY HAD ANY THOUGHTS OF KILLING YOURSELF?: NO
6. HAVE YOU EVER DONE ANYTHING, STARTED TO DO ANYTHING, OR PREPARED TO DO ANYTHING TO END YOUR LIFE?: NO

## 2024-09-11 ASSESSMENT — PAIN SCALES - GENERAL
PAINLEVEL_OUTOF10: 0 - NO PAIN
PAINLEVEL_OUTOF10: 0 - NO PAIN

## 2024-09-11 NOTE — PROGRESS NOTES
Rechecked patient at 11:19 AM and he is resting comfortably.  Does not appear to be in acute distress and all vital signs remained stable.  Nursing has been keeping a close eye on patient.  I requested an updated CK.
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
## 2024-09-11 NOTE — H&P
Chief Complaint   Patient presents with    Psychiatric Evaluation       HPI  Dee Dee Mancuso is a 29 y.o. male with PMH of ADHD, Epilepsy on Lamictal, Keppra, Depakote, Intellectual Disability, MRDD who presented to the ED for psychiatric evaluation.  Per ED notes patient was brought in following being violent and agitated in his group home to the point where he needed to be restrained.  Patient was evaluated the ED by EPAT and was pending placement to inpatient psych facility however he was noted to have increasing CK. On evaluation the Emergency Department patient states that he is being admitted because he had a seizure 2 days ago (he does have an ED visit on 9/8 for evaluation of seizure according to ED notes).  He does not recall having altercations with his group home staff early on today.  He otherwise denies any nausea, vomiting, chest pain, abdominal pain, or any SI/HI/auditory or visual hallucinations    ED Course:  Vitals: T36 HR 88 RR 16 /83 SpO2 98 on RA  Labs: Please see results section of this note  CK: 1630 -> 2311 -> 2392     Interventions:  s/p LR 1L bolus x3,   Consults: EPAT    ROS: A complete review of systems was performed and is otherwise negative except as noted in HPI    Patient History   No past medical history on file.  Past Surgical History:   Procedure Laterality Date    MR HEAD ANGIO WO IV CONTRAST  4/10/2019    MR HEAD ANGIO WO IV CONTRAST 4/10/2019 [facility] CLINICAL LEGACY    MR NECK ANGIO WO IV CONTRAST  4/10/2019    MR NECK ANGIO WO IV CONTRAST 4/10/2019 [facility] CLINICAL LEGACY           Allergies  Patient has no known allergies.    Last Recorded Vitals   /75 (BP Location: Left arm, Patient Position: Lying)   Pulse 83   Temp 36 °C (96.8 °F) (Temporal)   Resp 16   Wt 79.4 kg (175 lb)   SpO2 99%       Physical Exam  Vitals and nursing note reviewed.   Constitutional:       General: He is not in acute distress.  Eyes:      General: No scleral icterus.     Extraocular  Movements: Extraocular movements intact.   Cardiovascular:      Rate and Rhythm: Normal rate and regular rhythm.      Heart sounds: No murmur heard.  Pulmonary:      Effort: Pulmonary effort is normal. No respiratory distress.      Breath sounds: Normal breath sounds.   Abdominal:      General: Abdomen is flat. There is no distension.      Palpations: Abdomen is soft.      Tenderness: There is no abdominal tenderness.   Musculoskeletal:         General: No swelling. Normal range of motion.   Skin:     General: Skin is warm and dry.   Neurological:      Mental Status: He is alert and oriented to person, place, and time.        Relevant Results    Results for orders placed or performed during the hospital encounter of 09/10/24 (from the past 24 hour(s))   CBC and Auto Differential   Result Value Ref Range    WBC 4.4 4.4 - 11.3 x10*3/uL    nRBC 0.0 0.0 - 0.0 /100 WBCs    RBC 4.55 4.50 - 5.90 x10*6/uL    Hemoglobin 11.2 (L) 13.5 - 17.5 g/dL    Hematocrit 33.9 (L) 41.0 - 52.0 %    MCV 75 (L) 80 - 100 fL    MCH 24.6 (L) 26.0 - 34.0 pg    MCHC 33.0 32.0 - 36.0 g/dL    RDW 20.2 (H) 11.5 - 14.5 %    Platelets 194 150 - 450 x10*3/uL    Neutrophils % 58.6 40.0 - 80.0 %    Immature Granulocytes %, Automated 0.5 0.0 - 0.9 %    Lymphocytes % 23.4 13.0 - 44.0 %    Monocytes % 15.9 2.0 - 10.0 %    Eosinophils % 0.9 0.0 - 6.0 %    Basophils % 0.7 0.0 - 2.0 %    Neutrophils Absolute 2.55 1.20 - 7.70 x10*3/uL    Immature Granulocytes Absolute, Automated 0.02 0.00 - 0.70 x10*3/uL    Lymphocytes Absolute 1.02 (L) 1.20 - 4.80 x10*3/uL    Monocytes Absolute 0.69 0.10 - 1.00 x10*3/uL    Eosinophils Absolute 0.04 0.00 - 0.70 x10*3/uL    Basophils Absolute 0.03 0.00 - 0.10 x10*3/uL   Comprehensive Metabolic Panel   Result Value Ref Range    Glucose 92 74 - 99 mg/dL    Sodium 140 136 - 145 mmol/L    Potassium 3.6 3.5 - 5.3 mmol/L    Chloride 104 98 - 107 mmol/L    Bicarbonate 25 21 - 32 mmol/L    Anion Gap 15 10 - 20 mmol/L    Urea Nitrogen  14 6 - 23 mg/dL    Creatinine 0.86 0.50 - 1.30 mg/dL    eGFR >90 >60 mL/min/1.73m*2    Calcium 8.9 8.6 - 10.6 mg/dL    Albumin 4.5 3.4 - 5.0 g/dL    Alkaline Phosphatase 37 33 - 120 U/L    Total Protein 6.7 6.4 - 8.2 g/dL    AST 26 9 - 39 U/L    Bilirubin, Total 0.3 0.0 - 1.2 mg/dL    ALT 15 10 - 52 U/L   Drug Screen, Urine   Result Value Ref Range    Amphetamine Screen, Urine Presumptive Negative Presumptive Negative    Barbiturate Screen, Urine Presumptive Negative Presumptive Negative    Benzodiazepines Screen, Urine Presumptive Negative Presumptive Negative    Cannabinoid Screen, Urine Presumptive Negative Presumptive Negative    Cocaine Metabolite Screen, Urine Presumptive Negative Presumptive Negative    Fentanyl Screen, Urine Presumptive Negative Presumptive Negative    Opiate Screen, Urine Presumptive Negative Presumptive Negative    Oxycodone Screen, Urine Presumptive Negative Presumptive Negative    PCP Screen, Urine Presumptive Negative Presumptive Negative    Methadone Screen, Urine Presumptive Negative Presumptive Negative   Acute Toxicology Panel, Blood   Result Value Ref Range    Acetaminophen <10.0 10.0 - 30.0 ug/mL    Salicylate  <3 4 - 20 mg/dL    Alcohol <10 <=10 mg/dL   Levetiracetam   Result Value Ref Range    Keppra 12 10 - 40 ug/mL   Valproic Acid   Result Value Ref Range    Valproic Acid 45 (L) 50 - 100 ug/mL   Creatine Kinase   Result Value Ref Range    Creatine Kinase 1,630 (H) 0 - 325 U/L   Morphology   Result Value Ref Range    RBC Morphology No significant RBC morphology present    Creatine Kinase   Result Value Ref Range    Creatine Kinase 2,311 (H) 0 - 325 U/L   Cardiac Enzymes - CPK   Result Value Ref Range    Creatine Kinase 2,392 (H) 0 - 325 U/L       No results found.      Assessment/Plan      29 y.o. male with PMH of ADHD, epilepsy on Lamictal, Keppra, Depakote, intellectual disability, MRDD who presented to the ED for psychiatric evaluation.  Patient does make criteria for  inpatient psychiatry placement.  Given rising CK will admit patient to medicine with psychiatry following.  Will continue with hydration for patient and continue to monitor CKs.    #Elevated CK  -CK Trend: 1630 -> 2311 -> 2392  -s/p LR 3L  -Likely secondary to being restrained and altercations today  -Continue with rehydration,  cc/h for 24 hrs  -Continue to trend CKs   -Avoid nephrotoxic agents   -Conservative fluid resuscitation   -Monitor and replete electrolytes   -Strict I&Os, Monitor UOP  -Consult renal if not improving    #Psychiatric concern  #Unknown psychosis  -EPAT consulted in ED, appreciate recs  -Patient to be placed into MPU  -Will continue with home psychiatric medications for now, further recommendations pending psych recommendations    #Epilepsy  -Continue with home medications    F: s/p LR 3L bolus,  cc/hr  E: Replete as needed  N: Regular   GI: PPI  DVT: Ambulate   CODE STATUS: Full code    A total of 60 minutes was spent on this visit reviewing previous notes including inpatient ED, chart review of ambulatory records in EMR and OSH records in [facility], counseling the patient on substance use cessation, ordering tests and add on labs, medication reconciliation and adjusting meds, and documenting the findings in the note.    Wyatt Rubio MD

## 2024-09-11 NOTE — PROGRESS NOTES
Emergency Department Transition of Care Note       Signout   I received Dee Dee Mancuso in signout from Dr. Gonzales.  Please see the ED Provider Note for all HPI, PE and MDM up to the time of signout at 1500.  This is in addition to the primary record.    In brief Dee Dee Mancuso is an 29 y.o. male presenting for psychiatric evaluation and placement.  Patient was agitated and combative and uncooperative and brought in by police and EMS for further evaluation.    At the time of signout we were awaiting:  Patient was pending placement into facility by [facility] and was under observation status.    ED Course & Medical Decision Making   Medical Decision Making:  Under my care, patient CK came back elevated, was given a liter of fluids and continue to uptrend.  Spoke to both medicine in addition to psychiatry and the patient was accepted by both services to go to the MPU.  An additional liter of fluids was ordered for the patient.  Patient admitted to MPU in stable condition.    ED Course:  ED Course as of 09/10/24 2233   Tue Sep 10, 2024   0841 EKG reviewed normal sinus rhythm rate 87, WV interval 198 ms, QRS 86 ms, QTc 394 ms, no acute ST segment elevations or depressions [SA]   1050 CK elevated at 1,630, repeat CK ordered for 1500, CBC with anemia 11.2 similar to prior, acute tox panel negative, UDS negative, Keppra therapeutic, Depakote borderline [SA]   1350 EPAT recommends inpatient psychiatric admission [SA]   1350 Patient is medically clear [SA]   1728 Med rec complete and medications ordered.  Additionally patient CK was elevated at 2311.  Patient was given a liter of fluids and repeat CK was ordered at 1900 [KD]   2216 Spoke to both  In addition to Dr. Barnett who accepted the patient on behalf of the medical team and the psychiatry attending Dr. Padron [KD]      ED Course User Index  [KD] Norma Armendariz DO  [SA] Guillermina Gonzales DO         Diagnoses as of 09/10/24 2233   Psychiatric problem  "  Elevated creatine kinase       Disposition   As a result of their workup, the patient will require admission to the hospital.  The patient was informed of his diagnosis.  The patient was given the opportunity to ask questions and I answered them. The patient agreed to be admitted to the hospital.    Patient seen and discussed with ED attending.    Norma Armendariz DO, \"[facility]\", PGY-2  Emergency Medicine   "

## 2024-09-12 LAB
ALBUMIN SERPL BCP-MCNC: 3.9 G/DL (ref 3.4–5)
ALP SERPL-CCNC: 37 U/L (ref 33–120)
ALT SERPL W P-5'-P-CCNC: 12 U/L (ref 10–52)
ANION GAP SERPL CALC-SCNC: 11 MMOL/L (ref 10–20)
AST SERPL W P-5'-P-CCNC: 20 U/L (ref 9–39)
BILIRUB SERPL-MCNC: 0.4 MG/DL (ref 0–1.2)
BUN SERPL-MCNC: 6 MG/DL (ref 6–23)
CALCIUM SERPL-MCNC: 8.9 MG/DL (ref 8.6–10.6)
CHLORIDE SERPL-SCNC: 102 MMOL/L (ref 98–107)
CK SERPL-CCNC: 1769 U/L (ref 0–325)
CO2 SERPL-SCNC: 31 MMOL/L (ref 21–32)
CREAT SERPL-MCNC: 0.77 MG/DL (ref 0.5–1.3)
EGFRCR SERPLBLD CKD-EPI 2021: >90 ML/MIN/1.73M*2
GLUCOSE SERPL-MCNC: 82 MG/DL (ref 74–99)
POTASSIUM SERPL-SCNC: 4 MMOL/L (ref 3.5–5.3)
PROT SERPL-MCNC: 6.4 G/DL (ref 6.4–8.2)
SODIUM SERPL-SCNC: 140 MMOL/L (ref 136–145)

## 2024-09-12 PROCEDURE — 2500000004 HC RX 250 GENERAL PHARMACY W/ HCPCS (ALT 636 FOR OP/ED): Performed by: STUDENT IN AN ORGANIZED HEALTH CARE EDUCATION/TRAINING PROGRAM

## 2024-09-12 PROCEDURE — 99232 SBSQ HOSP IP/OBS MODERATE 35: CPT | Performed by: STUDENT IN AN ORGANIZED HEALTH CARE EDUCATION/TRAINING PROGRAM

## 2024-09-12 PROCEDURE — 36415 COLL VENOUS BLD VENIPUNCTURE: CPT | Performed by: NURSE PRACTITIONER

## 2024-09-12 PROCEDURE — 82550 ASSAY OF CK (CPK): CPT | Performed by: NURSE PRACTITIONER

## 2024-09-12 PROCEDURE — 2500000001 HC RX 250 WO HCPCS SELF ADMINISTERED DRUGS (ALT 637 FOR MEDICARE OP): Performed by: STUDENT IN AN ORGANIZED HEALTH CARE EDUCATION/TRAINING PROGRAM

## 2024-09-12 PROCEDURE — 99233 SBSQ HOSP IP/OBS HIGH 50: CPT | Performed by: PSYCHIATRY & NEUROLOGY

## 2024-09-12 PROCEDURE — 80053 COMPREHEN METABOLIC PANEL: CPT | Performed by: STUDENT IN AN ORGANIZED HEALTH CARE EDUCATION/TRAINING PROGRAM

## 2024-09-12 PROCEDURE — 1100000001 HC PRIVATE ROOM DAILY

## 2024-09-12 RX ORDER — ARIPIPRAZOLE 20 MG/1
20 TABLET ORAL DAILY
Status: DISCONTINUED | OUTPATIENT
Start: 2024-09-13 | End: 2024-09-13 | Stop reason: HOSPADM

## 2024-09-12 RX ORDER — SODIUM CHLORIDE, SODIUM LACTATE, POTASSIUM CHLORIDE, CALCIUM CHLORIDE 600; 310; 30; 20 MG/100ML; MG/100ML; MG/100ML; MG/100ML
200 INJECTION, SOLUTION INTRAVENOUS CONTINUOUS
Status: DISCONTINUED | OUTPATIENT
Start: 2024-09-12 | End: 2024-09-13 | Stop reason: HOSPADM

## 2024-09-12 RX ADMIN — POLYETHYLENE GLYCOL 3350 17 G: 17 POWDER, FOR SOLUTION ORAL at 09:24

## 2024-09-12 RX ADMIN — ARIPIPRAZOLE 10 MG: 5 TABLET ORAL at 09:23

## 2024-09-12 RX ADMIN — ESCITALOPRAM OXALATE 20 MG: 20 TABLET ORAL at 09:23

## 2024-09-12 RX ADMIN — LAMOTRIGINE 200 MG: 100 TABLET ORAL at 09:24

## 2024-09-12 RX ADMIN — SODIUM CHLORIDE, POTASSIUM CHLORIDE, SODIUM LACTATE AND CALCIUM CHLORIDE 200 ML/HR: 600; 310; 30; 20 INJECTION, SOLUTION INTRAVENOUS at 09:26

## 2024-09-12 RX ADMIN — DIVALPROEX SODIUM 750 MG: 500 TABLET, DELAYED RELEASE ORAL at 20:47

## 2024-09-12 RX ADMIN — LEVETIRACETAM 500 MG: 500 TABLET, FILM COATED ORAL at 20:47

## 2024-09-12 RX ADMIN — SODIUM CHLORIDE, POTASSIUM CHLORIDE, SODIUM LACTATE AND CALCIUM CHLORIDE 200 ML/HR: 600; 310; 30; 20 INJECTION, SOLUTION INTRAVENOUS at 00:17

## 2024-09-12 RX ADMIN — LAMOTRIGINE 200 MG: 100 TABLET ORAL at 20:47

## 2024-09-12 RX ADMIN — LEVETIRACETAM 500 MG: 500 TABLET, FILM COATED ORAL at 09:23

## 2024-09-12 ASSESSMENT — COGNITIVE AND FUNCTIONAL STATUS - GENERAL
DAILY ACTIVITIY SCORE: 24
MOBILITY SCORE: 24

## 2024-09-12 ASSESSMENT — COLUMBIA-SUICIDE SEVERITY RATING SCALE - C-SSRS
6. HAVE YOU EVER DONE ANYTHING, STARTED TO DO ANYTHING, OR PREPARED TO DO ANYTHING TO END YOUR LIFE?: NO
2. HAVE YOU ACTUALLY HAD ANY THOUGHTS OF KILLING YOURSELF?: NO
1. SINCE LAST CONTACT, HAVE YOU WISHED YOU WERE DEAD OR WISHED YOU COULD GO TO SLEEP AND NOT WAKE UP?: NO

## 2024-09-12 ASSESSMENT — PAIN SCALES - GENERAL
PAINLEVEL_OUTOF10: 0 - NO PAIN
PAINLEVEL_OUTOF10: 0 - NO PAIN

## 2024-09-12 ASSESSMENT — PAIN - FUNCTIONAL ASSESSMENT: PAIN_FUNCTIONAL_ASSESSMENT: 0-10

## 2024-09-12 ASSESSMENT — ACTIVITIES OF DAILY LIVING (ADL): LACK_OF_TRANSPORTATION: NO

## 2024-09-12 NOTE — CARE PLAN
The patient's goals for the shift include      The clinical goals for the shift include pt will be free of seizure activity during this shift    Over the shift, the patient did not make progress toward the following goals. Barriers to progression include ***. Recommendations to address these barriers include ***.

## 2024-09-12 NOTE — PROGRESS NOTES
"Dee Dee Mancuso is a 29 y.o. male on hospital day 2 of admission presenting with Psychiatric complaint.    Subjective   No PRNs overnight. Patient seen at bedside in the AM.    He reported limited memory of recent events. Stated he has been compliant with his epilepsy medications in recent past. He denied any new physical concerns. He stated he feels \"calm\".          Objective     Vital Signs:      9/11/2024     2:06 AM 9/11/2024     3:33 PM 9/11/2024     6:09 PM 9/11/2024     7:05 PM 9/11/2024     8:29 PM 9/12/2024    12:32 AM 9/12/2024     8:10 AM   Vitals   Systolic 128 142 142 138 129 129 126   Diastolic 81 89 99 83 72 79 69   Heart Rate 77 80 88 59 75 56 68   Temp 37 °C (98.6 °F) 36.5 °C (97.7 °F) 36 °C (96.8 °F) 37 °C (98.6 °F) 36.6 °C (97.9 °F) 36.9 °C (98.4 °F) 36.7 °C (98.1 °F)   Resp 16 16 16 18 18 18 18      Intake/Output last 3 Shifts:  I/O last 3 completed shifts:  In: 3003.3 (37.8 mL/kg) [I.V.:3003.3 (37.8 mL/kg)]  Out: - (0 mL/kg)   Weight: 79.4 kg     Mental Status Exam:  General/Appearance: NAD, appears older than stated age, in hospital gown, body habitus: average, grooming/hygiene is reasonable for setting,   Attitude/Behavior:  superficially cooperative , appropriate eye contact  Motor Activity: no psychomotor agitation/retardation, gait: not assessed  Mood: \"calm\"  Affect: Quality-mood congruent, Intensity-blunted, Range-constricted  Speech: under productive, soft speech, fluent quality  Thought Process: linear  Thought Content: denies SI/HI, Delusional thinking: none elicited  Thought Perception: denies AVH  Cognition: alert & oriented x 3 (did think this was [facility] and not [facility]), apparent deficit in recent memory  Insight: limited  Judgment: limited    Current Medications  Scheduled   ARIPiprazole, 10 mg, oral, BID after meals  divalproex, 750 mg, oral, Nightly  escitalopram, 20 mg, oral, Daily  lamoTRIgine, 200 mg, oral, BID  levETIRAcetam, 500 mg, oral, BID  polyethylene glycol, 17 g, oral, " Daily      Continuous   lactated Ringer's, 200 mL/hr, Last Rate: 200 mL/hr (09/12/24 0646)  lactated Ringer's, 200 mL/hr, Last Rate: 200 mL/hr (09/12/24 0646)  lactated Ringer's, 200 mL/hr, Last Rate: 200 mL/hr (09/12/24 0926)      PRN   PRN medications: midazolam **OR** midazolam     Labs  Results for orders placed or performed during the hospital encounter of 09/10/24 (from the past 24 hour(s))   Creatine Kinase   Result Value Ref Range    Creatine Kinase 2,548 (H) 0 - 325 U/L   Green Top   Result Value Ref Range    Extra Tube Hold for add-ons.    Lavender Top   Result Value Ref Range    Extra Tube Hold for add-ons.    Creatine Kinase   Result Value Ref Range    Creatine Kinase 1,769 (H) 0 - 325 U/L   Comprehensive metabolic panel   Result Value Ref Range    Glucose 82 74 - 99 mg/dL    Sodium 140 136 - 145 mmol/L    Potassium 4.0 3.5 - 5.3 mmol/L    Chloride 102 98 - 107 mmol/L    Bicarbonate 31 21 - 32 mmol/L    Anion Gap 11 10 - 20 mmol/L    Urea Nitrogen 6 6 - 23 mg/dL    Creatinine 0.77 0.50 - 1.30 mg/dL    eGFR >90 >60 mL/min/1.73m*2    Calcium 8.9 8.6 - 10.6 mg/dL    Albumin 3.9 3.4 - 5.0 g/dL    Alkaline Phosphatase 37 33 - 120 U/L    Total Protein 6.4 6.4 - 8.2 g/dL    AST 20 9 - 39 U/L    Bilirubin, Total 0.4 0.0 - 1.2 mg/dL    ALT 12 10 - 52 U/L        Imaging  No results found.     Psychiatric Risk Assessment  Acute Risk of Harm to Others is Considered: Low  Acute Risk of Harm to Self is Considered: Low    ASSESSMENT AND PLAN  This is a 29-year-old -American male with a history of intellectual disability epilepsy and ADHD who comes to the emergency room after being combative at his group home.  The patient required multiple intramuscular injections while in the emergency room.  Likely as a result of the intramuscular injections the patient's CK was raised and unfortunately he was not able to be placed psychiatrically.     Psychiatry was consulted on 9/10 for evaluation and disposition of the  patient     On initial assessment, the patient was calm and cooperative.  He was noted to be quite concrete and some of his answers were very simple like requesting candy.  Overall the patient did not really know exactly what occurred in the ED yesterday.  He did acknowledge that he may have had a seizure and sometimes up to seizures he may get agitated.  Otherwise the patient denied any psychiatric symptoms including depression/Anxiety, suicidal homicidal ideation intent or plan or psychosis or AVH.       His current symptomatology and the fact of how calm he is, along with the drake resolution of his symptoms, leads the writer to believe likely he is combativeness and agitation while in the group home may have been a result of a seizure and postictal psychosis given there was no report of aggressive behavior before this.  It has been noted in the past by previous providers and previous emergency room visits that the patient has been noncompliant to his AED regimen and frequently experiences seizures.  This may have been the case and would explain why this morning he has had such an abrupt turnaround in his behavior.  Likely the patient's current presentation is not related to underlying psychiatric illness but rather his poor compliance to AED regimen. This would also fit with his Depakote level being 45 on admission. As such the patient does not meet criteria for inpatient Psychiatry at this time. We will follow on the Med Psych unit and optimize med's where necessary. Likely once the patient's CK resolves he can likely DC    Update 9/12: Patient is calm and cooperative without any behavioral disturbances. He reported recent compliance to medication, unclear why he may have had a breakthrough seizure however considering subtherapeutic VPA, lamotrigine level of 12.3 (low considering daily dose of 400 mg and Depakote which increases lamotrigine level) and Keppra level suspect medication non adherence. CK  "improving.     IMPRESSION  Postictal psychosis  Epilepsy already seen  Intellectual disability  ADHD     RECOMMENDATIONS  Safety:  - Patient does not currently meet criteria for inpatient psychiatric admission.    - To evaluate decision-making capacity, recommend use of the Capacity Evaluation Tool. Search “[facility] Capacity Evaluation\" under SmartText unless the patient has a legal guardian, in which case all decisions per the legal guardian.  - Patient does not require a 1:1 sitter from a psychiatric perspective at this time.  - Defer to primary team decision for 1:1 sitter.  - As with all hospitalized patients, would recommend delirium precautions, as below.     Medications:  Continue Home Regimen  ARIPiprazole, 10 mg, oral, BID after meals - RECOMMEND consolidating to Aripiprazole 20 mg daily PO to improve adherence  divalproex, 750 mg, oral, Nightly  escitalopram, 20 mg, oral, Daily  lamotrigine, 200 mg, oral, BID  polyethylene glycol, 17 g, oral, Daily     Work-up:  - EKG (09/10): QTc of 371 ms      Ancillary Services:  -As tolerated by patient, he may benefit from art or music therapy    ==========  - Discussed recommendations with primary team.  - Psychiatry will continue to follow.     Thank you for allowing us to participate in the care of this patient. Please page [phone] with any questions or concerns.     Patient seen with Dr. Urrutia and discussed with Dr. Becerra, who agrees with above plan.    IDT:  -discussed during IDT with consulting attending, fellow, resident, social work, nursing, rec therapy (art/music) and students     Johnny Best, DO  PGY2 Psychiatry Resident  "

## 2024-09-12 NOTE — CARE PLAN
Problem: Self Care Deficit  Goal: STG - Patient completes hygiene  Outcome: Progressing  Goal: Increase group attendance  Outcome: Progressing  Goal: Accepts need for medications  Outcome: Progressing  Goal: STG - Goes to and eats meals independently in dining room 100% of time  Outcome: Progressing     Problem: Defensive Coping  Goal: Cooperates with admission process  Outcome: Progressing  Goal: Identifies reckless/dangerous behavior  Outcome: Progressing  Goal: Identifies stressors that lead to reckless/dangerous behavior  Outcome: Progressing  Goal: Discusses and identifies healthy coping skills  Outcome: Progressing  Goal: Demonstrates healthy coping skills  Outcome: Progressing  Goal: Identifies appropriate social interaction  Outcome: Progressing  Goal: Demonstrates appropriate social interactions  Outcome: Progressing  Goal: Patient/Family verbalizes awareness of resources  Outcome: Progressing  Goal: Discusses signs/symptoms of illness/treatment options  Outcome: Progressing  Goal: Patient/Family participate in treatment and discharge plans  Outcome: Progressing  Goal: Understands least restrictive measures  Outcome: Progressing  Goal: Free from restraint events  Outcome: Progressing     Problem: Pain  Goal: Takes deep breaths with improved pain control throughout the shift  Outcome: Progressing  Goal: Turns in bed with improved pain control throughout the shift  Outcome: Progressing  Goal: Walks with improved pain control throughout the shift  Outcome: Progressing  Goal: Performs ADL's with improved pain control throughout shift  Outcome: Progressing  Goal: Participates in PT with improved pain control throughout the shift  Outcome: Progressing  Goal: Free from opioid side effects throughout the shift  Outcome: Progressing  Goal: Free from acute confusion related to pain meds throughout the shift  Outcome: Progressing

## 2024-09-12 NOTE — PROGRESS NOTES
Dee Dee Mancuso is a 29 y.o. y.o. male on day 2 of admission presenting with Psychiatric Evaluation.     Subjective      09/12/24 3698   Discharge Planning   Living Arrangements Other (Comment)  (Patient resides in group home)   Support Systems Home care staff   Assistance Needed No needs identified   Type of Residence [facility]   Care Facility Name [facility]   Home or Post Acute Services Community services  (Psychiatry Follow Up)   Expected Discharge Disposition Other  (Group Home)   Does the patient need discharge transport arranged? Yes   RoundTrip coordination needed? Yes   Financial Resource Strain   How hard is it for you to pay for the very basics like food, housing, medical care, and heating? Hard   Housing Stability   In the last 12 months, was there a time when you were not able to pay the mortgage or rent on time? N   In the past 12 months, how many times have you moved where you were living? 0   At any time in the past 12 months, were you homeless or living in a shelter (including now)? N   Transportation Needs   In the past 12 months, has lack of transportation kept you from medical appointments or from getting medications? no   In the past 12 months, has lack of transportation kept you from meetings, work, or from getting things needed for daily living? No     Chart reviewed; noted that patient was residing at a group home prior to admission. Call placed to Troy Rea ([phone]) who is the  of [facility]. He reports that patient has resided at their facility since April 2022. He is requesting a phone call from psychiatry to further discuss patients behaviors that led to this admission. Message sent to psychiatry resident requesting that he touch base with Troy prior to discharge.     Troy reports agreement with [facility] for any medications needed for discharge. He would like a copy of the AVS emailed to [email or web address] once available.    Patient normally follows  with [facility] in [place] & he is agreeable for  to call and schedule follow up appointment.     Patient will require transportation assistance at discharge; likely Lyft/[employer].    - Nehal CRUM MA, W  Care Transitions   [facility] Secure Chat or [phone]

## 2024-09-12 NOTE — PROGRESS NOTES
Recreation Therapy Note    Therapy Session  Visit Type: New visit  Session Start Time: 1200  Session End Time: 1220  Intervention Delivery: In-person  Conflict of Service: None  Number of family members present: 0  Family Present for Session: None  Family Participation: None  Number of staff members present: 1    Pre-assessment  Unable to Assess Reason: Outcomes not applicable  Mood/Affect: Appropriate, Calm  Verbalized Emotional State:  (none verbalized)    Treatment  Areas of Focus: Coping, Socialization, Self-expression, Stress reduction, Relaxation  Co-Treatment:  (none)  Interruption: No  Patient Fell Asleep at End of Session: No    Post-assessment  Unable to Assess Reason: Outcomes not applicable    Mood/Affect: Appropriate, Calm, Cooperative, Participative  Total Session Time (min): 20 minutes    Narrative  Assessment Detail: Upon arrival patient was resting in bed and willing to engage in a recreaitonal therapy session.  Plan: To encourage the exploration of safe and effective positive leisure coping skills to manage situational stressors.  Intervention: Patient chose to play SHIRAZ  Evaluation: Patient was pleasant and cooperative and asked to play SHIRAZ.  Patient also stated that he spends much time at home doing word searches.  Appropriate participation throughout the time.  Follow-up: Will continue to encourage the exploration of positive leisure coping skills.    Dee Dee Mancuso is a 29 y.o. male with PMH of ADHD, Epilepsy on Lamictal, Keppra, Depakote, Intellectual Disability, MRDD who presented to the ED for psychiatric evaluation.  Per ED notes patient was brought in following being violent and agitated in his group home to the point where he needed to be restrained.  Patient was evaluated the ED by EPAT and was pending placement to inpatient psych facility however he was noted to have increasing CK. On evaluation the Emergency Department patient states that he is being admitted because he had a seizure 2  days ago (he does have an ED visit on 9/8 for evaluation of seizure according to ED notes).  He does not recall having altercations with his group home staff early on today.  He otherwise denies any nausea, vomiting, chest pain, abdominal pain, or any SI/HI/auditory or visual hallucinations

## 2024-09-12 NOTE — PROGRESS NOTES
INTERNAL MEDICINE PROGRESS NOTE     BRIEF NARRATIVE      Dee Dee Mancuso is a 29 y.o. male on day 2 of admission presenting with Psychiatric complaint.    29 y.o. male with PMH of ADHD, Epilepsy on Lamictal, Keppra, Depakote, Intellectual Disability, MRDD who presented to the ED for psychiatric evaluation.  Per ED notes patient was brought in following being violent and agitated in his group home to the point where he needed to be restrained.  Patient was evaluated the ED by EPAT and was pending placement to inpatient psych facility however he was noted to have increasing CK. On evaluation the Emergency Department patient states that he is being admitted because he had a seizure 2 days ago (he does have an ED visit on 9/8 for evaluation of seizure according to ED notes).  He does not recall having altercations with his group home staff early on today.  He otherwise denies any nausea, vomiting, chest pain, abdominal pain, or any SI/HI/auditory or visual hallucinations     SUBJECTIVE       Patient seen and examined today, sitting comfortably in bed, no distress.  The patient is hemodynamically stable    OBJECTIVE      Visit Vitals  /69   Pulse 68   Temp 36.7 °C (98.1 °F)   Resp 18        Intake/Output Summary (Last 24 hours) at 9/12/2024 1201  Last data filed at 9/12/2024 1100  Gross per 24 hour   Intake 3003.33 ml   Output 2150 ml   Net 853.33 ml       Physical Exam   Constitutional:       General: He is not in acute distress.  Eyes:      General: No scleral icterus.     Extraocular Movements: Extraocular movements intact.   Cardiovascular:      Rate and Rhythm: Normal rate and regular rhythm.      Heart sounds: No murmur  heard.  Pulmonary:      Effort: Pulmonary effort is normal. No respiratory distress.      Breath sounds: Normal breath sounds.   Abdominal:      General: Abdomen is flat. There is no distension.      Palpations: Abdomen is soft.      Tenderness: There is no abdominal tenderness.   Musculoskeletal:         General: No swelling. Normal range of motion.   Skin:     General: Skin is warm and dry.   Neurological:      Mental Status: He is alert and oriented to person, place, and time.   If you know yeah  Current Meds   ARIPiprazole, 10 mg, oral, BID after meals  divalproex, 750 mg, oral, Nightly  escitalopram, 20 mg, oral, Daily  lamoTRIgine, 200 mg, oral, BID  levETIRAcetam, 500 mg, oral, BID  polyethylene glycol, 17 g, oral, Daily       PRN medications: midazolam **OR** midazolam     LABS and IMAGING     WBC   Date Value Ref Range Status   09/10/2024 4.4 4.4 - 11.3 x10*3/uL Final     Hemoglobin   Date Value Ref Range Status   09/10/2024 11.2 (L) 13.5 - 17.5 g/dL Final     Hematocrit   Date Value Ref Range Status   09/10/2024 33.9 (L) 41.0 - 52.0 % Final     Bicarbonate   Date Value Ref Range Status   09/12/2024 31 21 - 32 mmol/L Final   09/10/2024 25 21 - 32 mmol/L Final     Creatinine   Date Value Ref Range Status   09/12/2024 0.77 0.50 - 1.30 mg/dL Final   09/10/2024 0.86 0.50 - 1.30 mg/dL Final     Calcium   Date Value Ref Range Status   09/12/2024 8.9 8.6 - 10.6 mg/dL Final   09/10/2024 8.9 8.6 - 10.6 mg/dL Final       Electrocardiogram, 12-lead  Normal sinus rhythm  Rightward axis  Borderline ECG  When compared with ECG of 08-SEP-2024 01:35,  No significant change was found    See ED provider note for full interpretation and clinical correlation  Confirmed by Fior Grijalva ([id number]) on 9/10/2024 11:16:35 PM       ASSESSMENT / PLANS      # Rhabdomyolysis  -CK Trend: 1630 -> 2311 -> 2392 --> 1769, gradually improving  -s/p LR 3L  -Likely secondary to prolonged immobilization from being restrained and altercations on  9/10   -UDS negative for cocaine or amphetamine    -Continue with rehydration,  cc/h for 24 hrs  -Continue to trend CKs   -Avoid nephrotoxic agents   -Conservative fluid resuscitation   -Monitor and replete electrolytes   -Strict I&Os, Monitor UOP  -Consult renal if not improving     #Psychiatric concern  #Unknown psychosis  -EPAT consulted in ED, appreciate recs  -Patient to be placed into MPU  -Psych consulted, appreciate rec  -Will continue with home psychiatric medications      #Epilepsy  -Continue with home medications     F: s/p LR 3L bolus,  cc/hr  E: Replete as needed  N: Regular   GI: PPI  DVT: Ambulate   CODE STATUS: Full code    Dispo: Will continue to trend CK, plan to discharge back to group home on 9/13    Mike Gracia MD

## 2024-09-12 NOTE — CARE PLAN
The patient's goals for the shift include      The clinical goals for the shift include Pt. CK level will be WNL this shift 1669-0170    Over the shift, the patient did not make progress toward the following goals.  Pt. CK level remains elevated this shift. LR @200ml/hr continues.

## 2024-09-13 ENCOUNTER — PHARMACY VISIT (OUTPATIENT)
Dept: PHARMACY | Facility: CLINIC | Age: 29
End: 2024-09-13
Payer: MEDICAID

## 2024-09-13 VITALS
SYSTOLIC BLOOD PRESSURE: 132 MMHG | RESPIRATION RATE: 18 BRPM | HEIGHT: 65 IN | TEMPERATURE: 98.4 F | DIASTOLIC BLOOD PRESSURE: 86 MMHG | BODY MASS INDEX: 29.16 KG/M2 | WEIGHT: 175 LBS | HEART RATE: 74 BPM | OXYGEN SATURATION: 97 %

## 2024-09-13 LAB — CK SERPL-CCNC: 1030 U/L (ref 0–325)

## 2024-09-13 PROCEDURE — 2500000001 HC RX 250 WO HCPCS SELF ADMINISTERED DRUGS (ALT 637 FOR MEDICARE OP): Performed by: STUDENT IN AN ORGANIZED HEALTH CARE EDUCATION/TRAINING PROGRAM

## 2024-09-13 PROCEDURE — 36415 COLL VENOUS BLD VENIPUNCTURE: CPT | Performed by: STUDENT IN AN ORGANIZED HEALTH CARE EDUCATION/TRAINING PROGRAM

## 2024-09-13 PROCEDURE — 2500000004 HC RX 250 GENERAL PHARMACY W/ HCPCS (ALT 636 FOR OP/ED): Performed by: STUDENT IN AN ORGANIZED HEALTH CARE EDUCATION/TRAINING PROGRAM

## 2024-09-13 PROCEDURE — RXMED WILLOW AMBULATORY MEDICATION CHARGE

## 2024-09-13 PROCEDURE — 99233 SBSQ HOSP IP/OBS HIGH 50: CPT | Performed by: PSYCHIATRY & NEUROLOGY

## 2024-09-13 PROCEDURE — 82550 ASSAY OF CK (CPK): CPT | Performed by: STUDENT IN AN ORGANIZED HEALTH CARE EDUCATION/TRAINING PROGRAM

## 2024-09-13 RX ORDER — BENZONATATE 100 MG/1
100 CAPSULE ORAL 3 TIMES DAILY PRN
Status: DISCONTINUED | OUTPATIENT
Start: 2024-09-13 | End: 2024-09-13 | Stop reason: HOSPADM

## 2024-09-13 RX ORDER — ARIPIPRAZOLE 10 MG/1
20 TABLET ORAL DAILY
Qty: 60 TABLET | Refills: 1 | Status: SHIPPED | OUTPATIENT
Start: 2024-09-13 | End: 2024-11-12

## 2024-09-13 RX ORDER — BENZONATATE 100 MG/1
100 CAPSULE ORAL 3 TIMES DAILY PRN
Qty: 20 CAPSULE | Refills: 0 | Status: SHIPPED | OUTPATIENT
Start: 2024-09-13

## 2024-09-13 RX ADMIN — ESCITALOPRAM OXALATE 20 MG: 20 TABLET ORAL at 09:37

## 2024-09-13 RX ADMIN — LAMOTRIGINE 200 MG: 100 TABLET ORAL at 09:37

## 2024-09-13 RX ADMIN — SODIUM CHLORIDE, POTASSIUM CHLORIDE, SODIUM LACTATE AND CALCIUM CHLORIDE 200 ML/HR: 600; 310; 30; 20 INJECTION, SOLUTION INTRAVENOUS at 09:33

## 2024-09-13 RX ADMIN — POLYETHYLENE GLYCOL 3350 17 G: 17 POWDER, FOR SOLUTION ORAL at 09:37

## 2024-09-13 RX ADMIN — LEVETIRACETAM 500 MG: 500 TABLET, FILM COATED ORAL at 09:36

## 2024-09-13 NOTE — PROGRESS NOTES
Dee Dee Mancuso is a 29 y.o. y.o. male on day 3 of admission presenting with Psychiatric Evaluation.     Subjective   Received update that patient is medically cleared for discharge and is longer meeting inpatient psychiatry criteria. Per conversation with [facility]  Troy Rea yesterday, he is agreeable for patient to return today. Medications were ordered for M2B and floor to arrange transportation back to the group home. Unit secretary and bedside RN were updated.    Copy of patients AVS was sent via email to [email or web address].     SW remains available as needed.    - Nehal CRUM, MA, LSW  Care Transitions   [employer] Secure Chat or [phone]

## 2024-09-13 NOTE — NURSING NOTE
Went over discharge instructions with patient. Patient voiced understanding. Answered all questions and concerns.

## 2024-09-13 NOTE — PROGRESS NOTES
Dee Dee Mancuso is a 29 y.o. male on hospital day 3 of admission presenting with Psychiatric complaint.    Subjective   No PRNs overnight. Patient seen at bedside in the AM.    He was calm and cooperative on approach. He endorsed no new concerns other than wanting to leave and return home. He provided verbal permission for me to discuss his healthcare and treatment plan/aftercare with group home owner Troy.    Additional Information: Spoke with Troy on the phone  Troy Ogden (Friend)  [phone] (Mobile)   He expressed some concern about the patient returning to group home due to severity of his behavior prior to presenting to the hospital. I explained that this behavior was due to seizure and it is not uncommon after a seizure episode. I reiterated the importance of adherence to AED's as prescribed. I also advised follow up with psychiatry and Epilepsy (I also called [facility] prior to this call to request hospital discharge follow up with his epileptologist). Troy was understanding and agreeable with plan to discharge later today with outpatient follow up and RTC precautions.          Objective     Vital Signs:      9/11/2024     7:05 PM 9/11/2024     8:29 PM 9/12/2024    12:32 AM 9/12/2024     8:10 AM 9/12/2024     3:38 PM 9/12/2024    11:00 PM 9/13/2024     7:41 AM   Vitals   Systolic 138 129 129 126 132 138 132   Diastolic 83 72 79 69 89 74 86   Heart Rate 59 75 56 68 63 68 74   Temp 37 °C (98.6 °F) 36.6 °C (97.9 °F) 36.9 °C (98.4 °F) 36.7 °C (98.1 °F) 36.4 °C (97.5 °F) 36.6 °C (97.9 °F) 36.9 °C (98.4 °F)   Resp 18 18 18 18 16 16 18      Intake/Output last 3 Shifts:  I/O last 3 completed shifts:  In: 6303.3 (79.4 mL/kg) [P.O.:800; I.V.:5503.3 (69.3 mL/kg)]  Out: 3150 (39.7 mL/kg) [Urine:3150 (1.1 mL/kg/hr)]  Weight: 79.4 kg     Mental Status Exam:  General/Appearance: NAD, appears older than stated age, in hospital gown, body habitus: average, grooming/hygiene is good,    , IV in  "place  Attitude/Behavior: engages in interview, appropriate eye contact  Motor Activity: no psychomotor agitation/retardation, gait: not assessed  Mood: \"ok\"  Affect: Quality-mood congruent, Intensity-blunted, Range-restricted to lower range  Speech:  soft tone, otherwise normal  Thought Process: linear  Thought Content: denies SI/HI, Delusional thinking: none elicited  Thought Perception: denies AVH  Cognition: alert & oriented x 4  Insight: fair  Judgment: fair    Current Medications  Scheduled   ARIPiprazole, 20 mg, oral, Daily  divalproex, 750 mg, oral, Nightly  escitalopram, 20 mg, oral, Daily  lamoTRIgine, 200 mg, oral, BID  levETIRAcetam, 500 mg, oral, BID  polyethylene glycol, 17 g, oral, Daily      Continuous   lactated Ringer's, 200 mL/hr, Last Rate: 200 mL/hr (09/12/24 2156)  lactated Ringer's, 200 mL/hr, Last Rate: 200 mL/hr (09/13/24 0933)      PRN   PRN medications: benzonatate, midazolam **OR** midazolam     Labs  Results for orders placed or performed during the hospital encounter of 09/10/24 (from the past 24 hour(s))   Creatine Kinase   Result Value Ref Range    Creatine Kinase 1,030 (H) 0 - 325 U/L        Imaging  No results found.     ASSESSMENT AND PLAN  This is a 29-year-old -American male with a history of intellectual disability epilepsy and ADHD who comes to the emergency room after being combative at his group home.  The patient required multiple intramuscular injections while in the emergency room.  Likely as a result of the intramuscular injections the patient's CK was raised and unfortunately he was not able to be placed psychiatrically.     Psychiatry was consulted on 9/10 for evaluation and disposition of the patient     On initial assessment, the patient was calm and cooperative.  He was noted to be quite concrete and some of his answers were very simple like requesting candy.  Overall the patient did not really know exactly what occurred in the ED yesterday.  He did " acknowledge that he may have had a seizure and sometimes up to seizures he may get agitated.  Otherwise the patient denied any psychiatric symptoms including depression/Anxiety, suicidal homicidal ideation intent or plan or psychosis or AVH.       His current symptomatology and the fact of how calm he is, along with the drake resolution of his symptoms, leads the writer to believe likely he is combativeness and agitation while in the group home may have been a result of a seizure and postictal psychosis given there was no report of aggressive behavior before this.  It has been noted in the past by previous providers and previous emergency room visits that the patient has been noncompliant to his AED regimen and frequently experiences seizures.  This may have been the case and would explain why this morning he has had such an abrupt turnaround in his behavior.  Likely the patient's current presentation is not related to underlying psychiatric illness but rather his poor compliance to AED regimen. This would also fit with his Depakote level being 45 on admission. As such the patient does not meet criteria for inpatient Psychiatry at this time. We will follow on the Med Psych unit and optimize med's where necessary. Likely once the patient's CK resolves he can likely DC     Update 9/12: Patient is calm and cooperative without any behavioral disturbances. He reported recent compliance to medication, unclear why he may have had a breakthrough seizure however considering subtherapeutic VPA, lamotrigine level of 12.3 (low considering daily dose of 400 mg and Depakote which increases lamotrigine level) and Keppra level suspect medication non adherence. CK improving.  Update 9/13: Patient remains in behavioral control, no evidence of significant derangement in mood/no evidence of psychosis. Spoke with group home facilitator diandra and provided update. CK continues to down trend. No need for inpatient psychiatry. Okay for  "discharge later today.  Has psychiatry follow up next week and placed call for epilepsy follow up.     IMPRESSION  Postictal psychosis- resolved  Epilepsy   Intellectual disability  ADHD     RECOMMENDATIONS  Safety:  - Patient does not currently meet criteria for inpatient psychiatric admission.    - To evaluate decision-making capacity, recommend use of the Capacity Evaluation Tool. Search “[facility] Capacity Evaluation\" under SmartText unless the patient has a legal guardian, in which case all decisions per the legal guardian.  - Patient does not require a 1:1 sitter from a psychiatric perspective at this time.  - Defer to primary team decision for 1:1 sitter.  - As with all hospitalized patients, would recommend delirium precautions, as below.     Medications:  Continue Home Regimen  ARIPiprazole, 20 mg, oral, daily after meal  divalproex, 750 mg, oral, Nightly  escitalopram, 20 mg, oral, Daily  lamotrigine, 200 mg, oral, BID  polyethylene glycol, 17 g, oral, Daily     Work-up:  - EKG (09/10): QTc of 371 ms      Ancillary Services:  -As tolerated by patient, he may benefit from art or music therapy     ==========  - Discussed recommendations with primary team.  - Psychiatry will continue to follow.     Thank you for allowing us to participate in the care of this patient. Please page [phone] with any questions or concerns.     Patient seen with Dr. Urrutia and discussed with Dr. Becerra, who agrees with above plan.     IDT:  -discussed during IDT with consulting attending, fellow, resident, social work, nursing, rec therapy (art/music) and students     Johnny Best, DO  PGY2 Psychiatry Resident  "

## 2024-09-13 NOTE — CARE PLAN
Problem: Skin  Goal: Decreased wound size/increased tissue granulation at next dressing change  Outcome: Progressing  Goal: Participates in plan/prevention/treatment measures  Outcome: Progressing  Goal: Prevent/manage excess moisture  Outcome: Progressing  Goal: Prevent/minimize sheer/friction injuries  Outcome: Progressing  Goal: Promote/optimize nutrition  Outcome: Progressing  Goal: Promote skin healing  Outcome: Progressing     Problem: Fall/Injury  Goal: Not fall by end of shift  Outcome: Progressing  Goal: Be free from injury by end of the shift  Outcome: Progressing  Goal: Verbalize understanding of personal risk factors for fall in the hospital  Outcome: Progressing  Goal: Verbalize understanding of risk factor reduction measures to prevent injury from fall in the home  Outcome: Progressing  Goal: Use assistive devices by end of the shift  Outcome: Progressing  Goal: Pace activities to prevent fatigue by end of the shift  Outcome: Progressing     Problem: Pain - Adult  Goal: Verbalizes/displays adequate comfort level or baseline comfort level  Outcome: Progressing     Problem: Safety - Adult  Goal: Free from fall injury  Outcome: Progressing     Problem: Discharge Planning  Goal: Discharge to home or other facility with appropriate resources  Outcome: Progressing     Problem: Chronic Conditions and Co-morbidities  Goal: Patient's chronic conditions and co-morbidity symptoms are monitored and maintained or improved  Outcome: Progressing     Problem: Sensory Perceptual Alteration as Evidenced by  Goal: Cooperates with admission process  Outcome: Progressing  Goal: Patient/Family participate in treatment and discharge plans  Outcome: Progressing  Goal: Patient/Family verbalizes awareness of resources  Outcome: Progressing  Goal: Participates in unit activities  Outcome: Progressing  Goal: Discusses signs/symptoms of illness/treatment options  Outcome: Progressing  Goal: Initiates reality-based  interactions  Outcome: Progressing  Goal: Able to discuss content of hallucinations/delusions  Outcome: Progressing  Goal: Notifies staff when experiencing hallucinations/delusions  Outcome: Progressing  Goal: Verbalizes reduction in hallucinations/delusions  Outcome: Progressing  Goal: Will not act on psychotic perception  Outcome: Progressing  Goal: Understands least restrictive measures  Outcome: Progressing  Goal: Free from restraint events  Outcome: Progressing     Problem: Altered Thought Processes as Evidenced by  Goal: STG - Desires improvement in ability to think and concentrate  Outcome: Progressing  Goal: STG - Participates in Occupational Therapy and other cognitive assessments  Outcome: Progressing     Problem: Potential for Harm to Self or Others  Goal: Cooperates with admission process  Outcome: Progressing  Goal: Participates in unit activities  Outcome: Progressing  Goal: Patient/Family participate in treatment and discharge plans  Outcome: Progressing  Goal: Identifies deescalation techniques  Outcome: Progressing  Goal: Understands least restrictive measures  Outcome: Progressing  Goal: Identifies stressors that lead to harmful behaviors  Outcome: Progressing  Goal: Notifies staff when experiencing harmful thoughts toward self/others  Outcome: Progressing  Goal: Denies harm toward self or others  Outcome: Progressing  Goal: Free from restraint events  Outcome: Progressing     Problem: Educational/Scholastic Disruption  Goal: Meets educational requirements during hospitalization  Outcome: Progressing  Goal: Attends class without disruptive behavior  Outcome: Progressing  Goal: Completes daily assignments  Outcome: Progressing     Problem: Ineffective Coping  Goal: Cooperates with admission process  Outcome: Progressing  Goal: Identifies ineffective coping skills  Outcome: Progressing  Goal: Identifies healthy coping skills  Outcome: Progressing  Goal: Demonstrates healthy coping skills  Outcome:  Progressing  Goal: Participates in unit activities  Outcome: Progressing  Goal: Patient/Family participate in treatment and discharge plans  Outcome: Progressing  Goal: Patient/Family verbalizes awareness of resources  Outcome: Progressing  Goal: Understands least restrictive measures  Outcome: Progressing  Goal: Free from restraint events  Outcome: Progressing     Problem: Alteration in Sleep  Goal: STG - Reports nightly sleep, duration, and quality  Outcome: Progressing  Goal: STG - Identifies sleep hygiene aids  Outcome: Progressing  Goal: STG - Informs staff if unable to sleep  Outcome: Progressing  Goal: STG - Attends breathing and relaxation group  Outcome: Progressing     Problem: Potential for Substance Withdrawal  Goal: Verbalizes signs/symptoms of withdrawal  Outcome: Progressing  Goal: Reports signs/symptoms of withdrawal  Outcome: Progressing  Goal: Free of withdrawal symptoms  Outcome: Progressing     Problem: Anxiety  Goal: Patient/family understands admission protocols  Outcome: Progressing  Goal: Attempts to manage anxiety with help  Outcome: Progressing  Goal: Verbalizes ways to manage anxiety  Outcome: Progressing  Goal: Implements measures to reduce anxiety  Outcome: Progressing  Goal: Free from restraint events  Outcome: Progressing

## 2024-09-13 NOTE — DISCHARGE INSTR - APPOINTMENTS
[facility] Psychiatry Follow Up  -Thursday 9/19 at 10:40am (virtual appointment) with Ny DIETRICH   - Please call their office at [phone] if appointment needs rescheduled

## 2024-09-25 NOTE — DISCHARGE SUMMARY
Discharge Diagnosis  Psychiatric complaint    Issues Requiring Follow-Up  None     Discharge Meds     Medication List      START taking these medications     benzonatate 100 mg capsule; Commonly known as: Tessalon; Take 1 capsule   (100 mg) by mouth 3 times a day as needed for cough. Do not crush or chew.     CHANGE how you take these medications     ARIPiprazole 10 mg tablet; Commonly known as: Abilify; Take 2 tablets   (20 mg) by mouth once daily. Morning and dinner; What changed: how much to   take, when to take this   lamoTRIgine 100 mg tablet; Commonly known as: LaMICtal; Take 1 tablet   (100 mg) by mouth twice daily. Until 5/27 then take 2 tablets (200mg)   twice daily.; What changed: additional instructions     CONTINUE taking these medications     cholecalciferol 50 MCG (2000 UT) tablet; Commonly known as: Vitamin D-3   divalproex 250 mg EC tablet; Commonly known as: Depakote   escitalopram 20 mg tablet; Commonly known as: Lexapro   levETIRAcetam  mg 24 hr tablet; Commonly known as: Keppra XR; Take   3 tablets (1,500 mg) by mouth once daily. Do not crush, chew, or split.       Test Results Pending At Discharge  Pending Labs       No current pending labs.            Hospital Course   Dee Dee Mancuso is a 29 y.o. male with PMH of ADHD, Epilepsy on Lamictal, Keppra, Depakote, Intellectual Disability, MRDD who presented to the ED for psychiatric evaluation.  Per ED notes patient was brought in following being violent and agitated in his group home to the point where he needed to be restrained.  Patient was evaluated the ED by EPAT and was pending placement to inpatient psych facility however he was noted to have increasing CK. On evaluation the Emergency Department patient states that he is being admitted because he had a seizure 2 days ago (he does have an ED visit on 9/8 for evaluation of seizure according to ED notes).  He does not recall having altercations with his group home staff early on today.  He  otherwise denies any nausea, vomiting, chest pain, abdominal pain, or any SI/HI/auditory or visual hallucinations.   CK was significantly elevated, patient was treated with aggressive fluid resuscitation.  Psychology was consulted, patient did not meet the criteria for inpatient psych admission.  Patient condition gradually improved    Pertinent Physical Exam At Time of Discharge  Physical Exam  Constitutional:       General: He is not in acute distress.  Eyes:      General: No scleral icterus.     Extraocular Movements: Extraocular movements intact.   Cardiovascular:      Rate and Rhythm: Normal rate and regular rhythm.      Heart sounds: No murmur heard.  Pulmonary:      Effort: Pulmonary effort is normal. No respiratory distress.      Breath sounds: Normal breath sounds.   Abdominal:      General: Abdomen is flat. There is no distension.      Palpations: Abdomen is soft.      Tenderness: There is no abdominal tenderness.   Musculoskeletal:         General: No swelling. Normal range of motion.   Skin:     General: Skin is warm and dry.   Neurological:      Mental Status: He is alert and oriented to person, place, and time.   If you know yeah    Outpatient Follow-Up  No future appointments.      Mike Gracia MD

## 2025-08-21 ENCOUNTER — APPOINTMENT (OUTPATIENT)
Dept: RADIOLOGY | Facility: HOSPITAL | Age: 30
End: 2025-08-21
Payer: COMMERCIAL

## 2025-08-21 ENCOUNTER — HOSPITAL ENCOUNTER (EMERGENCY)
Facility: HOSPITAL | Age: 30
Discharge: HOME | End: 2025-08-21
Attending: EMERGENCY MEDICINE
Payer: COMMERCIAL

## 2025-08-21 VITALS
BODY MASS INDEX: 29.16 KG/M2 | HEIGHT: 65 IN | DIASTOLIC BLOOD PRESSURE: 78 MMHG | TEMPERATURE: 98.1 F | OXYGEN SATURATION: 99 % | WEIGHT: 175 LBS | HEART RATE: 89 BPM | RESPIRATION RATE: 16 BRPM | SYSTOLIC BLOOD PRESSURE: 128 MMHG

## 2025-08-21 DIAGNOSIS — R56.9 SEIZURE (MULTI): Primary | ICD-10-CM

## 2025-08-21 DIAGNOSIS — S01.81XA FACIAL LACERATION, INITIAL ENCOUNTER: ICD-10-CM

## 2025-08-21 LAB
ALBUMIN SERPL BCP-MCNC: 4.5 G/DL (ref 3.4–5)
ALP SERPL-CCNC: 44 U/L (ref 33–120)
ALT SERPL W P-5'-P-CCNC: 30 U/L (ref 10–52)
ANION GAP BLDV CALCULATED.4IONS-SCNC: 11 MMOL/L (ref 10–25)
ANION GAP SERPL CALC-SCNC: 17 MMOL/L (ref 10–20)
APPEARANCE UR: CLEAR
AST SERPL W P-5'-P-CCNC: 19 U/L (ref 9–39)
BASE EXCESS BLDV CALC-SCNC: -1.5 MMOL/L (ref -2–3)
BASOPHILS # BLD AUTO: 0.03 X10*3/UL (ref 0–0.1)
BASOPHILS NFR BLD AUTO: 0.8 %
BILIRUB SERPL-MCNC: 0.4 MG/DL (ref 0–1.2)
BILIRUB UR STRIP.AUTO-MCNC: NEGATIVE MG/DL
BODY TEMPERATURE: 37 DEGREES CELSIUS
BUN SERPL-MCNC: 12 MG/DL (ref 6–23)
CA-I BLDV-SCNC: 1.2 MMOL/L (ref 1.1–1.33)
CALCIUM SERPL-MCNC: 8.9 MG/DL (ref 8.6–10.6)
CHLORIDE BLDV-SCNC: 107 MMOL/L (ref 98–107)
CHLORIDE SERPL-SCNC: 106 MMOL/L (ref 98–107)
CO2 SERPL-SCNC: 21 MMOL/L (ref 21–32)
COLOR UR: NORMAL
CREAT SERPL-MCNC: 0.9 MG/DL (ref 0.5–1.3)
EGFRCR SERPLBLD CKD-EPI 2021: >90 ML/MIN/1.73M*2
EOSINOPHIL # BLD AUTO: 0.06 X10*3/UL (ref 0–0.7)
EOSINOPHIL NFR BLD AUTO: 1.6 %
ERYTHROCYTE [DISTWIDTH] IN BLOOD BY AUTOMATED COUNT: 13.3 % (ref 11.5–14.5)
GLUCOSE BLDV-MCNC: 100 MG/DL (ref 74–99)
GLUCOSE SERPL-MCNC: 95 MG/DL (ref 74–99)
GLUCOSE UR STRIP.AUTO-MCNC: NORMAL MG/DL
HCO3 BLDV-SCNC: 24.3 MMOL/L (ref 22–26)
HCT VFR BLD AUTO: 40.3 % (ref 41–52)
HCT VFR BLD EST: 43 % (ref 41–52)
HGB BLD-MCNC: 13.5 G/DL (ref 13.5–17.5)
HGB BLDV-MCNC: 14.3 G/DL (ref 13.5–17.5)
IMM GRANULOCYTES # BLD AUTO: 0.04 X10*3/UL (ref 0–0.7)
IMM GRANULOCYTES NFR BLD AUTO: 1.1 % (ref 0–0.9)
KETONES UR STRIP.AUTO-MCNC: NEGATIVE MG/DL
LACTATE BLDV-SCNC: 7.1 MMOL/L (ref 0.4–2)
LAMOTRIGINE SERPL-MCNC: 7 UG/ML (ref 2.5–15)
LEUKOCYTE ESTERASE UR QL STRIP.AUTO: NEGATIVE
LEVETIRACETAM SERPL-MCNC: <2 UG/ML (ref 10–40)
LYMPHOCYTES # BLD AUTO: 1.22 X10*3/UL (ref 1.2–4.8)
LYMPHOCYTES NFR BLD AUTO: 32.7 %
MAGNESIUM SERPL-MCNC: 1.91 MG/DL (ref 1.6–2.4)
MCH RBC QN AUTO: 28.8 PG (ref 26–34)
MCHC RBC AUTO-ENTMCNC: 33.5 G/DL (ref 32–36)
MCV RBC AUTO: 86 FL (ref 80–100)
MONOCYTES # BLD AUTO: 0.38 X10*3/UL (ref 0.1–1)
MONOCYTES NFR BLD AUTO: 10.2 %
NEUTROPHILS # BLD AUTO: 2 X10*3/UL (ref 1.2–7.7)
NEUTROPHILS NFR BLD AUTO: 53.6 %
NITRITE UR QL STRIP.AUTO: NEGATIVE
NRBC BLD-RTO: 0 /100 WBCS (ref 0–0)
OXYHGB MFR BLDV: 91.7 % (ref 45–75)
PCO2 BLDV: 44 MM HG (ref 41–51)
PH BLDV: 7.35 PH (ref 7.33–7.43)
PH UR STRIP.AUTO: 8 [PH]
PLATELET # BLD AUTO: 188 X10*3/UL (ref 150–450)
PO2 BLDV: 71 MM HG (ref 35–45)
POTASSIUM BLDV-SCNC: 4 MMOL/L (ref 3.5–5.3)
POTASSIUM SERPL-SCNC: 3.8 MMOL/L (ref 3.5–5.3)
PROT SERPL-MCNC: 6.8 G/DL (ref 6.4–8.2)
PROT UR STRIP.AUTO-MCNC: NEGATIVE MG/DL
RBC # BLD AUTO: 4.69 X10*6/UL (ref 4.5–5.9)
RBC # UR STRIP.AUTO: NEGATIVE MG/DL
SAO2 % BLDV: 94 % (ref 45–75)
SODIUM BLDV-SCNC: 138 MMOL/L (ref 136–145)
SODIUM SERPL-SCNC: 140 MMOL/L (ref 136–145)
SP GR UR STRIP.AUTO: 1.02
UROBILINOGEN UR STRIP.AUTO-MCNC: NORMAL MG/DL
VALPROATE SERPL-MCNC: 21 UG/ML (ref 50–100)
WBC # BLD AUTO: 3.7 X10*3/UL (ref 4.4–11.3)

## 2025-08-21 PROCEDURE — 84132 ASSAY OF SERUM POTASSIUM: CPT

## 2025-08-21 PROCEDURE — 70486 CT MAXILLOFACIAL W/O DYE: CPT | Performed by: STUDENT IN AN ORGANIZED HEALTH CARE EDUCATION/TRAINING PROGRAM

## 2025-08-21 PROCEDURE — 2500000001 HC RX 250 WO HCPCS SELF ADMINISTERED DRUGS (ALT 637 FOR MEDICARE OP)

## 2025-08-21 PROCEDURE — 2500000004 HC RX 250 GENERAL PHARMACY W/ HCPCS (ALT 636 FOR OP/ED)

## 2025-08-21 PROCEDURE — 70450 CT HEAD/BRAIN W/O DYE: CPT

## 2025-08-21 PROCEDURE — 2500000001 HC RX 250 WO HCPCS SELF ADMINISTERED DRUGS (ALT 637 FOR MEDICARE OP): Performed by: EMERGENCY MEDICINE

## 2025-08-21 PROCEDURE — 80177 DRUG SCRN QUAN LEVETIRACETAM: CPT

## 2025-08-21 PROCEDURE — 99285 EMERGENCY DEPT VISIT HI MDM: CPT | Performed by: EMERGENCY MEDICINE

## 2025-08-21 PROCEDURE — 80175 DRUG SCREEN QUAN LAMOTRIGINE: CPT

## 2025-08-21 PROCEDURE — 72125 CT NECK SPINE W/O DYE: CPT | Performed by: STUDENT IN AN ORGANIZED HEALTH CARE EDUCATION/TRAINING PROGRAM

## 2025-08-21 PROCEDURE — 82435 ASSAY OF BLOOD CHLORIDE: CPT

## 2025-08-21 PROCEDURE — 81003 URINALYSIS AUTO W/O SCOPE: CPT

## 2025-08-21 PROCEDURE — 85025 COMPLETE CBC W/AUTO DIFF WBC: CPT

## 2025-08-21 PROCEDURE — 83735 ASSAY OF MAGNESIUM: CPT

## 2025-08-21 PROCEDURE — 2500000004 HC RX 250 GENERAL PHARMACY W/ HCPCS (ALT 636 FOR OP/ED): Performed by: EMERGENCY MEDICINE

## 2025-08-21 PROCEDURE — 80164 ASSAY DIPROPYLACETIC ACD TOT: CPT

## 2025-08-21 PROCEDURE — 36415 COLL VENOUS BLD VENIPUNCTURE: CPT

## 2025-08-21 PROCEDURE — 76376 3D RENDER W/INTRP POSTPROCES: CPT

## 2025-08-21 PROCEDURE — 70450 CT HEAD/BRAIN W/O DYE: CPT | Performed by: STUDENT IN AN ORGANIZED HEALTH CARE EDUCATION/TRAINING PROGRAM

## 2025-08-21 PROCEDURE — 96361 HYDRATE IV INFUSION ADD-ON: CPT | Mod: 59

## 2025-08-21 PROCEDURE — 12013 RPR F/E/E/N/L/M 2.6-5.0 CM: CPT

## 2025-08-21 PROCEDURE — 12013 RPR F/E/E/N/L/M 2.6-5.0 CM: CPT | Performed by: EMERGENCY MEDICINE

## 2025-08-21 PROCEDURE — 72125 CT NECK SPINE W/O DYE: CPT

## 2025-08-21 PROCEDURE — 96374 THER/PROPH/DIAG INJ IV PUSH: CPT | Mod: 59

## 2025-08-21 PROCEDURE — 70486 CT MAXILLOFACIAL W/O DYE: CPT

## 2025-08-21 PROCEDURE — 99284 EMERGENCY DEPT VISIT MOD MDM: CPT | Mod: 25 | Performed by: EMERGENCY MEDICINE

## 2025-08-21 RX ORDER — LAMOTRIGINE 25 MG/1
25 TABLET ORAL 2 TIMES DAILY
Qty: 60 TABLET | Refills: 0 | Status: SHIPPED | OUTPATIENT
Start: 2025-08-21 | End: 2026-08-21

## 2025-08-21 RX ORDER — ONDANSETRON HYDROCHLORIDE 2 MG/ML
4 INJECTION, SOLUTION INTRAVENOUS ONCE
Status: COMPLETED | OUTPATIENT
Start: 2025-08-21 | End: 2025-08-21

## 2025-08-21 RX ORDER — LAMOTRIGINE 100 MG/1
200 TABLET ORAL 2 TIMES DAILY
Qty: 120 TABLET | Refills: 0 | Status: SHIPPED | OUTPATIENT
Start: 2025-08-21 | End: 2025-08-21

## 2025-08-21 RX ORDER — DIVALPROEX SODIUM 250 MG/1
750 TABLET, FILM COATED, EXTENDED RELEASE ORAL DAILY
Qty: 90 TABLET | Refills: 0 | Status: SHIPPED | OUTPATIENT
Start: 2025-08-21 | End: 2025-08-21

## 2025-08-21 RX ORDER — ONDANSETRON HYDROCHLORIDE 2 MG/ML
INJECTION, SOLUTION INTRAVENOUS
Status: COMPLETED
Start: 2025-08-21 | End: 2025-08-21

## 2025-08-21 RX ORDER — DIVALPROEX SODIUM 250 MG/1
750 TABLET, FILM COATED, EXTENDED RELEASE ORAL DAILY
Qty: 90 TABLET | Refills: 0 | Status: SHIPPED | OUTPATIENT
Start: 2025-08-21 | End: 2025-09-20

## 2025-08-21 RX ORDER — LEVETIRACETAM 500 MG/1
1000 TABLET, EXTENDED RELEASE ORAL DAILY
Status: DISCONTINUED | OUTPATIENT
Start: 2025-08-21 | End: 2025-08-21 | Stop reason: HOSPADM

## 2025-08-21 RX ORDER — LEVETIRACETAM 500 MG/1
1000 TABLET, EXTENDED RELEASE ORAL DAILY
Qty: 60 TABLET | Refills: 0 | Status: SHIPPED | OUTPATIENT
Start: 2025-08-21 | End: 2025-08-21

## 2025-08-21 RX ORDER — LEVETIRACETAM 500 MG/1
1000 TABLET, EXTENDED RELEASE ORAL DAILY
Qty: 60 TABLET | Refills: 0 | Status: SHIPPED | OUTPATIENT
Start: 2025-08-21 | End: 2026-08-21

## 2025-08-21 RX ORDER — LAMOTRIGINE 25 MG/1
25 TABLET ORAL 2 TIMES DAILY
Qty: 60 TABLET | Refills: 11 | Status: SHIPPED | OUTPATIENT
Start: 2025-08-21 | End: 2025-08-21

## 2025-08-21 RX ORDER — DIVALPROEX SODIUM 250 MG/1
750 TABLET, FILM COATED, EXTENDED RELEASE ORAL DAILY
Status: DISCONTINUED | OUTPATIENT
Start: 2025-08-21 | End: 2025-08-21 | Stop reason: HOSPADM

## 2025-08-21 RX ORDER — LAMOTRIGINE 25 MG/1
25 TABLET, ORALLY DISINTEGRATING ORAL DAILY
Qty: 30 TABLET | Refills: 0 | Status: SHIPPED | OUTPATIENT
Start: 2025-08-21 | End: 2025-08-21 | Stop reason: WASHOUT

## 2025-08-21 RX ORDER — LAMOTRIGINE 100 MG/1
200 TABLET ORAL ONCE
Status: COMPLETED | OUTPATIENT
Start: 2025-08-21 | End: 2025-08-21

## 2025-08-21 RX ORDER — LAMOTRIGINE 100 MG/1
225 TABLET ORAL 2 TIMES DAILY
Qty: 150 TABLET | Refills: 1 | Status: SHIPPED | OUTPATIENT
Start: 2025-08-21 | End: 2025-08-21

## 2025-08-21 RX ORDER — LAMOTRIGINE 100 MG/1
TABLET ORAL
Status: DISCONTINUED
Start: 2025-08-21 | End: 2025-08-21 | Stop reason: HOSPADM

## 2025-08-21 RX ORDER — LAMOTRIGINE 100 MG/1
200 TABLET ORAL 2 TIMES DAILY
Qty: 120 TABLET | Refills: 0 | Status: SHIPPED | OUTPATIENT
Start: 2025-08-21 | End: 2025-10-20

## 2025-08-21 RX ADMIN — DIVALPROEX SODIUM 750 MG: 250 TABLET, EXTENDED RELEASE ORAL at 15:38

## 2025-08-21 RX ADMIN — ONDANSETRON HYDROCHLORIDE 4 MG: 2 INJECTION, SOLUTION INTRAVENOUS at 13:27

## 2025-08-21 RX ADMIN — LEVETIRACETAM 1000 MG: 500 TABLET, EXTENDED RELEASE ORAL at 16:43

## 2025-08-21 RX ADMIN — ONDANSETRON 4 MG: 2 INJECTION INTRAMUSCULAR; INTRAVENOUS at 13:27

## 2025-08-21 RX ADMIN — Medication 1 APPLICATION: at 15:44

## 2025-08-21 RX ADMIN — LAMOTRIGINE 200 MG: 100 TABLET ORAL at 15:39

## 2025-08-21 RX ADMIN — SODIUM CHLORIDE, SODIUM LACTATE, POTASSIUM CHLORIDE, AND CALCIUM CHLORIDE 1000 ML: 600; 310; 30; 20 INJECTION, SOLUTION INTRAVENOUS at 13:26

## 2025-08-21 ASSESSMENT — PAIN DESCRIPTION - FREQUENCY: FREQUENCY: CONSTANT/CONTINUOUS

## 2025-08-21 ASSESSMENT — LIFESTYLE VARIABLES
HAVE YOU EVER FELT YOU SHOULD CUT DOWN ON YOUR DRINKING: NO
EVER HAD A DRINK FIRST THING IN THE MORNING TO STEADY YOUR NERVES TO GET RID OF A HANGOVER: NO
EVER FELT BAD OR GUILTY ABOUT YOUR DRINKING: NO
HAVE PEOPLE ANNOYED YOU BY CRITICIZING YOUR DRINKING: NO
TOTAL SCORE: 0

## 2025-08-21 ASSESSMENT — PAIN DESCRIPTION - PAIN TYPE: TYPE: ACUTE PAIN

## 2025-08-21 ASSESSMENT — PAIN DESCRIPTION - LOCATION: LOCATION: HEAD

## 2025-08-21 ASSESSMENT — PAIN DESCRIPTION - ORIENTATION: ORIENTATION: POSTERIOR

## 2025-08-21 ASSESSMENT — PAIN SCALES - GENERAL: PAINLEVEL_OUTOF10: 5 - MODERATE PAIN

## 2025-08-21 ASSESSMENT — PAIN DESCRIPTION - DESCRIPTORS: DESCRIPTORS: ACHING

## 2025-08-21 ASSESSMENT — PAIN - FUNCTIONAL ASSESSMENT: PAIN_FUNCTIONAL_ASSESSMENT: 0-10

## 2025-08-22 LAB — HOLD SPECIMEN: NORMAL
